# Patient Record
Sex: MALE | Race: BLACK OR AFRICAN AMERICAN | NOT HISPANIC OR LATINO | Employment: UNEMPLOYED | URBAN - METROPOLITAN AREA
[De-identification: names, ages, dates, MRNs, and addresses within clinical notes are randomized per-mention and may not be internally consistent; named-entity substitution may affect disease eponyms.]

---

## 2021-01-04 ENCOUNTER — APPOINTMENT (INPATIENT)
Dept: CT IMAGING | Facility: HOSPITAL | Age: 46
DRG: 720 | End: 2021-01-04
Payer: MEDICAID

## 2021-01-04 ENCOUNTER — APPOINTMENT (EMERGENCY)
Dept: RADIOLOGY | Facility: HOSPITAL | Age: 46
DRG: 720 | End: 2021-01-04
Payer: MEDICAID

## 2021-01-04 ENCOUNTER — HOSPITAL ENCOUNTER (INPATIENT)
Facility: HOSPITAL | Age: 46
LOS: 8 days | Discharge: HOME | DRG: 720 | End: 2021-01-12
Attending: EMERGENCY MEDICINE | Admitting: INTERNAL MEDICINE
Payer: MEDICAID

## 2021-01-04 DIAGNOSIS — R74.01 TRANSAMINITIS: ICD-10-CM

## 2021-01-04 DIAGNOSIS — E87.5 HYPERKALEMIA: ICD-10-CM

## 2021-01-04 DIAGNOSIS — J12.82 PNEUMONIA DUE TO COVID-19 VIRUS: ICD-10-CM

## 2021-01-04 DIAGNOSIS — U07.1 PNEUMONIA DUE TO COVID-19 VIRUS: ICD-10-CM

## 2021-01-04 DIAGNOSIS — R09.02 HYPOXIA: Primary | ICD-10-CM

## 2021-01-04 DIAGNOSIS — R68.89 FLU-LIKE SYMPTOMS: ICD-10-CM

## 2021-01-04 LAB
ALBUMIN SERPL BCP-MCNC: 2.7 G/DL (ref 3.5–5)
ALP SERPL-CCNC: 157 U/L (ref 46–116)
ALT SERPL W P-5'-P-CCNC: 103 U/L (ref 12–78)
ANION GAP SERPL CALCULATED.3IONS-SCNC: 10 MMOL/L (ref 4–13)
APTT PPP: 38 SECONDS (ref 23–37)
AST SERPL W P-5'-P-CCNC: 79 U/L (ref 5–45)
BASOPHILS # BLD MANUAL: 0 THOUSAND/UL (ref 0–0.1)
BASOPHILS NFR MAR MANUAL: 0 % (ref 0–1)
BILIRUB DIRECT SERPL-MCNC: 0.41 MG/DL (ref 0–0.2)
BILIRUB SERPL-MCNC: 0.8 MG/DL (ref 0.2–1)
BUN SERPL-MCNC: 18 MG/DL (ref 5–25)
CALCIUM SERPL-MCNC: 8.7 MG/DL (ref 8.3–10.1)
CHLORIDE SERPL-SCNC: 99 MMOL/L (ref 100–108)
CO2 SERPL-SCNC: 27 MMOL/L (ref 21–32)
CREAT SERPL-MCNC: 1.41 MG/DL (ref 0.6–1.3)
D DIMER PPP FEU-MCNC: 1.44 UG/ML FEU
EOSINOPHIL # BLD MANUAL: 0 THOUSAND/UL (ref 0–0.4)
EOSINOPHIL NFR BLD MANUAL: 0 % (ref 0–6)
ERYTHROCYTE [DISTWIDTH] IN BLOOD BY AUTOMATED COUNT: 12.1 % (ref 11.6–15.1)
GFR SERPL CREATININE-BSD FRML MDRD: 68 ML/MIN/1.73SQ M
GLUCOSE SERPL-MCNC: 170 MG/DL (ref 65–140)
HCT VFR BLD AUTO: 40.9 % (ref 36.5–49.3)
HGB BLD-MCNC: 13.5 G/DL (ref 12–17)
INR PPP: 1.02 (ref 0.84–1.19)
LACTATE SERPL-SCNC: 1.7 MMOL/L (ref 0.5–2)
LYMPHOCYTES # BLD AUTO: 0.59 THOUSAND/UL (ref 0.6–4.47)
LYMPHOCYTES # BLD AUTO: 8 % (ref 14–44)
MAGNESIUM SERPL-MCNC: 2.9 MG/DL (ref 1.6–2.6)
MCH RBC QN AUTO: 30 PG (ref 26.8–34.3)
MCHC RBC AUTO-ENTMCNC: 33 G/DL (ref 31.4–37.4)
MCV RBC AUTO: 91 FL (ref 82–98)
MONOCYTES # BLD AUTO: 0.81 THOUSAND/UL (ref 0–1.22)
MONOCYTES NFR BLD: 11 % (ref 4–12)
NEUTROPHILS # BLD MANUAL: 5.9 THOUSAND/UL (ref 1.85–7.62)
NEUTS BAND NFR BLD MANUAL: 1 % (ref 0–8)
NEUTS SEG NFR BLD AUTO: 79 % (ref 43–75)
NRBC BLD AUTO-RTO: 0 /100 WBCS
NT-PROBNP SERPL-MCNC: 108 PG/ML
PLATELET # BLD AUTO: 425 THOUSANDS/UL (ref 149–390)
PLATELET BLD QL SMEAR: ABNORMAL
PMV BLD AUTO: 9 FL (ref 8.9–12.7)
POTASSIUM SERPL-SCNC: 4.2 MMOL/L (ref 3.5–5.3)
PROT SERPL-MCNC: 8.3 G/DL (ref 6.4–8.2)
PROTHROMBIN TIME: 13.6 SECONDS (ref 11.6–14.5)
RBC # BLD AUTO: 4.5 MILLION/UL (ref 3.88–5.62)
SODIUM SERPL-SCNC: 136 MMOL/L (ref 136–145)
TOTAL CELLS COUNTED SPEC: 100
TROPONIN I SERPL-MCNC: <0.02 NG/ML
VARIANT LYMPHS # BLD AUTO: 1 %
WBC # BLD AUTO: 7.38 THOUSAND/UL (ref 4.31–10.16)

## 2021-01-04 PROCEDURE — 83880 ASSAY OF NATRIURETIC PEPTIDE: CPT | Performed by: EMERGENCY MEDICINE

## 2021-01-04 PROCEDURE — 36415 COLL VENOUS BLD VENIPUNCTURE: CPT | Performed by: EMERGENCY MEDICINE

## 2021-01-04 PROCEDURE — 85610 PROTHROMBIN TIME: CPT | Performed by: EMERGENCY MEDICINE

## 2021-01-04 PROCEDURE — 80076 HEPATIC FUNCTION PANEL: CPT | Performed by: EMERGENCY MEDICINE

## 2021-01-04 PROCEDURE — 84439 ASSAY OF FREE THYROXINE: CPT | Performed by: EMERGENCY MEDICINE

## 2021-01-04 PROCEDURE — 82550 ASSAY OF CK (CPK): CPT | Performed by: PHYSICIAN ASSISTANT

## 2021-01-04 PROCEDURE — 85730 THROMBOPLASTIN TIME PARTIAL: CPT | Performed by: EMERGENCY MEDICINE

## 2021-01-04 PROCEDURE — 99285 EMERGENCY DEPT VISIT HI MDM: CPT | Performed by: EMERGENCY MEDICINE

## 2021-01-04 PROCEDURE — 84443 ASSAY THYROID STIM HORMONE: CPT | Performed by: EMERGENCY MEDICINE

## 2021-01-04 PROCEDURE — 83735 ASSAY OF MAGNESIUM: CPT | Performed by: EMERGENCY MEDICINE

## 2021-01-04 PROCEDURE — 86140 C-REACTIVE PROTEIN: CPT | Performed by: PHYSICIAN ASSISTANT

## 2021-01-04 PROCEDURE — 71275 CT ANGIOGRAPHY CHEST: CPT

## 2021-01-04 PROCEDURE — 80048 BASIC METABOLIC PNL TOTAL CA: CPT | Performed by: EMERGENCY MEDICINE

## 2021-01-04 PROCEDURE — 85379 FIBRIN DEGRADATION QUANT: CPT | Performed by: EMERGENCY MEDICINE

## 2021-01-04 PROCEDURE — 96365 THER/PROPH/DIAG IV INF INIT: CPT

## 2021-01-04 PROCEDURE — 83605 ASSAY OF LACTIC ACID: CPT | Performed by: EMERGENCY MEDICINE

## 2021-01-04 PROCEDURE — 84484 ASSAY OF TROPONIN QUANT: CPT | Performed by: EMERGENCY MEDICINE

## 2021-01-04 PROCEDURE — 82728 ASSAY OF FERRITIN: CPT | Performed by: PHYSICIAN ASSISTANT

## 2021-01-04 PROCEDURE — 85027 COMPLETE CBC AUTOMATED: CPT | Performed by: EMERGENCY MEDICINE

## 2021-01-04 PROCEDURE — 99285 EMERGENCY DEPT VISIT HI MDM: CPT

## 2021-01-04 PROCEDURE — 84145 PROCALCITONIN (PCT): CPT | Performed by: EMERGENCY MEDICINE

## 2021-01-04 PROCEDURE — 71045 X-RAY EXAM CHEST 1 VIEW: CPT

## 2021-01-04 PROCEDURE — 96375 TX/PRO/DX INJ NEW DRUG ADDON: CPT

## 2021-01-04 PROCEDURE — 87040 BLOOD CULTURE FOR BACTERIA: CPT | Performed by: EMERGENCY MEDICINE

## 2021-01-04 PROCEDURE — 85007 BL SMEAR W/DIFF WBC COUNT: CPT | Performed by: EMERGENCY MEDICINE

## 2021-01-04 PROCEDURE — G1004 CDSM NDSC: HCPCS

## 2021-01-04 PROCEDURE — 93005 ELECTROCARDIOGRAM TRACING: CPT

## 2021-01-04 RX ORDER — ALBUTEROL SULFATE 90 UG/1
5 AEROSOL, METERED RESPIRATORY (INHALATION) ONCE
Status: COMPLETED | OUTPATIENT
Start: 2021-01-04 | End: 2021-01-04

## 2021-01-04 RX ORDER — ACETAMINOPHEN 325 MG/1
650 TABLET ORAL ONCE
Status: COMPLETED | OUTPATIENT
Start: 2021-01-04 | End: 2021-01-04

## 2021-01-04 RX ORDER — KETOROLAC TROMETHAMINE 30 MG/ML
15 INJECTION, SOLUTION INTRAMUSCULAR; INTRAVENOUS ONCE
Status: COMPLETED | OUTPATIENT
Start: 2021-01-04 | End: 2021-01-04

## 2021-01-04 RX ORDER — DEXAMETHASONE SODIUM PHOSPHATE 4 MG/ML
6 INJECTION, SOLUTION INTRA-ARTICULAR; INTRALESIONAL; INTRAMUSCULAR; INTRAVENOUS; SOFT TISSUE ONCE
Status: COMPLETED | OUTPATIENT
Start: 2021-01-04 | End: 2021-01-04

## 2021-01-04 RX ORDER — DOXYCYCLINE HYCLATE 100 MG/1
100 CAPSULE ORAL ONCE
Status: COMPLETED | OUTPATIENT
Start: 2021-01-04 | End: 2021-01-04

## 2021-01-04 RX ADMIN — DEXAMETHASONE SODIUM PHOSPHATE 6 MG: 4 INJECTION, SOLUTION INTRAMUSCULAR; INTRAVENOUS at 20:49

## 2021-01-04 RX ADMIN — ACETAMINOPHEN 650 MG: 325 TABLET, FILM COATED ORAL at 21:04

## 2021-01-04 RX ADMIN — IOHEXOL 100 ML: 350 INJECTION, SOLUTION INTRAVENOUS at 22:46

## 2021-01-04 RX ADMIN — ALBUTEROL SULFATE 5 PUFF: 90 AEROSOL, METERED RESPIRATORY (INHALATION) at 20:49

## 2021-01-04 RX ADMIN — SODIUM CHLORIDE 1000 ML: 0.9 INJECTION, SOLUTION INTRAVENOUS at 20:35

## 2021-01-04 RX ADMIN — CEFTRIAXONE SODIUM 1000 MG: 10 INJECTION, POWDER, FOR SOLUTION INTRAVENOUS at 21:05

## 2021-01-04 RX ADMIN — KETOROLAC TROMETHAMINE 15 MG: 30 INJECTION, SOLUTION INTRAMUSCULAR at 22:11

## 2021-01-04 RX ADMIN — DOXYCYCLINE 100 MG: 100 CAPSULE ORAL at 21:04

## 2021-01-04 NOTE — LETTER
To whom it May concern  Patient Hugo Otto,  1975 stated that he has been tested positive for COVID-19 on 2020  He came to our facility on 2021 and was treated for COVID-19 pneumonia, he responded well and he has been discharged on 2021  According to were current recommendations should be in isolation for 20 days after symptom onset, this would be until 2021  After that patient is not deemed transmissible as long as he is asymptomatic as per recommendations  We are discouraging repeat testing unless patient is symptomatic given the fact that test can be positive several weeks after      The information above has been shared as per patient request                       Jose Zapata MD  Internal Medicine

## 2021-01-05 PROBLEM — N17.9 AKI (ACUTE KIDNEY INJURY) (HCC): Status: ACTIVE | Noted: 2021-01-05

## 2021-01-05 PROBLEM — E27.9 ADRENAL NODULE (HCC): Status: ACTIVE | Noted: 2021-01-05

## 2021-01-05 PROBLEM — E04.9 ENLARGED THYROID GLAND: Status: ACTIVE | Noted: 2021-01-05

## 2021-01-05 PROBLEM — E83.41 HYPERMAGNESEMIA: Status: ACTIVE | Noted: 2021-01-05

## 2021-01-05 PROBLEM — J12.82 PNEUMONIA DUE TO COVID-19 VIRUS: Status: ACTIVE | Noted: 2021-01-05

## 2021-01-05 PROBLEM — E27.8 ADRENAL NODULE (HCC): Status: ACTIVE | Noted: 2021-01-05

## 2021-01-05 PROBLEM — U07.1 PNEUMONIA DUE TO COVID-19 VIRUS: Status: ACTIVE | Noted: 2021-01-05

## 2021-01-05 PROBLEM — A41.9 SEPSIS (HCC): Status: ACTIVE | Noted: 2021-01-05

## 2021-01-05 PROBLEM — E10.9 DIABETES MELLITUS TYPE 1 (HCC): Chronic | Status: ACTIVE | Noted: 2021-01-05

## 2021-01-05 PROBLEM — R74.01 TRANSAMINITIS: Status: ACTIVE | Noted: 2021-01-05

## 2021-01-05 LAB
ABO GROUP BLD: NORMAL
ALBUMIN SERPL BCP-MCNC: 2.3 G/DL (ref 3.5–5)
ALP SERPL-CCNC: 140 U/L (ref 46–116)
ALT SERPL W P-5'-P-CCNC: 86 U/L (ref 12–78)
ANION GAP SERPL CALCULATED.3IONS-SCNC: 11 MMOL/L (ref 4–13)
AST SERPL W P-5'-P-CCNC: 50 U/L (ref 5–45)
ATRIAL RATE: 93 BPM
BILIRUB SERPL-MCNC: 0.5 MG/DL (ref 0.2–1)
BUN SERPL-MCNC: 20 MG/DL (ref 5–25)
CALCIUM ALBUM COR SERPL-MCNC: 10 MG/DL (ref 8.3–10.1)
CALCIUM SERPL-MCNC: 8.6 MG/DL (ref 8.3–10.1)
CHLORIDE SERPL-SCNC: 103 MMOL/L (ref 100–108)
CK SERPL-CCNC: 119 U/L (ref 39–308)
CO2 SERPL-SCNC: 25 MMOL/L (ref 21–32)
CREAT SERPL-MCNC: 1.19 MG/DL (ref 0.6–1.3)
CRP SERPL QL: 201 MG/L
D DIMER PPP FEU-MCNC: 2.67 UG/ML FEU
ERYTHROCYTE [DISTWIDTH] IN BLOOD BY AUTOMATED COUNT: 12.1 % (ref 11.6–15.1)
EST. AVERAGE GLUCOSE BLD GHB EST-MCNC: 255 MG/DL
FERRITIN SERPL-MCNC: 1448 NG/ML (ref 8–388)
GFR SERPL CREATININE-BSD FRML MDRD: 84 ML/MIN/1.73SQ M
GLUCOSE SERPL-MCNC: 164 MG/DL (ref 65–140)
GLUCOSE SERPL-MCNC: 180 MG/DL (ref 65–140)
GLUCOSE SERPL-MCNC: 186 MG/DL (ref 65–140)
GLUCOSE SERPL-MCNC: 188 MG/DL (ref 65–140)
GLUCOSE SERPL-MCNC: 207 MG/DL (ref 65–140)
HBA1C MFR BLD: 10.5 %
HCT VFR BLD AUTO: 37.7 % (ref 36.5–49.3)
HGB BLD-MCNC: 12.3 G/DL (ref 12–17)
MAGNESIUM SERPL-MCNC: 3 MG/DL (ref 1.6–2.6)
MCH RBC QN AUTO: 30.1 PG (ref 26.8–34.3)
MCHC RBC AUTO-ENTMCNC: 32.6 G/DL (ref 31.4–37.4)
MCV RBC AUTO: 92 FL (ref 82–98)
NRBC BLD AUTO-RTO: 0 /100 WBCS
P AXIS: 43 DEGREES
PLATELET # BLD AUTO: 405 THOUSANDS/UL (ref 149–390)
PMV BLD AUTO: 8.8 FL (ref 8.9–12.7)
POTASSIUM SERPL-SCNC: 4.8 MMOL/L (ref 3.5–5.3)
PR INTERVAL: 132 MS
PROCALCITONIN SERPL-MCNC: 0.17 NG/ML
PROCALCITONIN SERPL-MCNC: 0.21 NG/ML
PROT SERPL-MCNC: 7.6 G/DL (ref 6.4–8.2)
QRS AXIS: 2 DEGREES
QRSD INTERVAL: 86 MS
QT INTERVAL: 344 MS
QTC INTERVAL: 427 MS
RBC # BLD AUTO: 4.08 MILLION/UL (ref 3.88–5.62)
RH BLD: NEGATIVE
SODIUM SERPL-SCNC: 139 MMOL/L (ref 136–145)
T WAVE AXIS: -25 DEGREES
T4 FREE SERPL-MCNC: 1.19 NG/DL (ref 0.76–1.46)
TSH SERPL DL<=0.05 MIU/L-ACNC: 0.28 UIU/ML (ref 0.36–3.74)
VENTRICULAR RATE: 93 BPM
WBC # BLD AUTO: 6.8 THOUSAND/UL (ref 4.31–10.16)

## 2021-01-05 PROCEDURE — 93010 ELECTROCARDIOGRAM REPORT: CPT | Performed by: INTERNAL MEDICINE

## 2021-01-05 PROCEDURE — 82948 REAGENT STRIP/BLOOD GLUCOSE: CPT

## 2021-01-05 PROCEDURE — 86900 BLOOD TYPING SEROLOGIC ABO: CPT | Performed by: PHYSICIAN ASSISTANT

## 2021-01-05 PROCEDURE — 84145 PROCALCITONIN (PCT): CPT | Performed by: EMERGENCY MEDICINE

## 2021-01-05 PROCEDURE — 83735 ASSAY OF MAGNESIUM: CPT | Performed by: PHYSICIAN ASSISTANT

## 2021-01-05 PROCEDURE — 36415 COLL VENOUS BLD VENIPUNCTURE: CPT | Performed by: PHYSICIAN ASSISTANT

## 2021-01-05 PROCEDURE — 80053 COMPREHEN METABOLIC PANEL: CPT | Performed by: PHYSICIAN ASSISTANT

## 2021-01-05 PROCEDURE — 85379 FIBRIN DEGRADATION QUANT: CPT | Performed by: PHYSICIAN ASSISTANT

## 2021-01-05 PROCEDURE — 86901 BLOOD TYPING SEROLOGIC RH(D): CPT | Performed by: PHYSICIAN ASSISTANT

## 2021-01-05 PROCEDURE — 83036 HEMOGLOBIN GLYCOSYLATED A1C: CPT | Performed by: PHYSICIAN ASSISTANT

## 2021-01-05 PROCEDURE — 85027 COMPLETE CBC AUTOMATED: CPT | Performed by: PHYSICIAN ASSISTANT

## 2021-01-05 PROCEDURE — 99223 1ST HOSP IP/OBS HIGH 75: CPT | Performed by: INTERNAL MEDICINE

## 2021-01-05 RX ORDER — ACETAMINOPHEN 325 MG/1
650 TABLET ORAL EVERY 6 HOURS PRN
Status: COMPLETED | OUTPATIENT
Start: 2021-01-05 | End: 2021-01-06

## 2021-01-05 RX ORDER — ASCORBIC ACID 500 MG
1000 TABLET ORAL EVERY 12 HOURS SCHEDULED
Status: COMPLETED | OUTPATIENT
Start: 2021-01-05 | End: 2021-01-11

## 2021-01-05 RX ORDER — DEXAMETHASONE SODIUM PHOSPHATE 4 MG/ML
6 INJECTION, SOLUTION INTRA-ARTICULAR; INTRALESIONAL; INTRAMUSCULAR; INTRAVENOUS; SOFT TISSUE EVERY 24 HOURS
Status: DISCONTINUED | OUTPATIENT
Start: 2021-01-05 | End: 2021-01-10

## 2021-01-05 RX ORDER — DOXYCYCLINE HYCLATE 100 MG/1
100 CAPSULE ORAL EVERY 12 HOURS
Status: DISCONTINUED | OUTPATIENT
Start: 2021-01-05 | End: 2021-01-12 | Stop reason: HOSPADM

## 2021-01-05 RX ORDER — HEPARIN SODIUM 5000 [USP'U]/ML
5000 INJECTION, SOLUTION INTRAVENOUS; SUBCUTANEOUS EVERY 8 HOURS SCHEDULED
Status: DISCONTINUED | OUTPATIENT
Start: 2021-01-05 | End: 2021-01-07

## 2021-01-05 RX ORDER — SODIUM CHLORIDE 9 MG/ML
50 INJECTION, SOLUTION INTRAVENOUS CONTINUOUS
Status: DISPENSED | OUTPATIENT
Start: 2021-01-05 | End: 2021-01-05

## 2021-01-05 RX ORDER — FAMOTIDINE 20 MG/1
20 TABLET, FILM COATED ORAL 2 TIMES DAILY
Status: DISCONTINUED | OUTPATIENT
Start: 2021-01-05 | End: 2021-01-12 | Stop reason: HOSPADM

## 2021-01-05 RX ORDER — MELATONIN
2000 DAILY
Status: DISCONTINUED | OUTPATIENT
Start: 2021-01-05 | End: 2021-01-12 | Stop reason: HOSPADM

## 2021-01-05 RX ORDER — ZINC SULFATE 50(220)MG
220 CAPSULE ORAL DAILY
Status: COMPLETED | OUTPATIENT
Start: 2021-01-05 | End: 2021-01-11

## 2021-01-05 RX ORDER — KETOROLAC TROMETHAMINE 30 MG/ML
15 INJECTION, SOLUTION INTRAMUSCULAR; INTRAVENOUS ONCE
Status: COMPLETED | OUTPATIENT
Start: 2021-01-05 | End: 2021-01-05

## 2021-01-05 RX ORDER — MULTIVITAMIN/IRON/FOLIC ACID 18MG-0.4MG
1 TABLET ORAL DAILY
Status: DISCONTINUED | OUTPATIENT
Start: 2021-01-12 | End: 2021-01-12 | Stop reason: HOSPADM

## 2021-01-05 RX ORDER — LANOLIN ALCOHOL/MO/W.PET/CERES
3 CREAM (GRAM) TOPICAL
Status: DISCONTINUED | OUTPATIENT
Start: 2021-01-05 | End: 2021-01-12 | Stop reason: HOSPADM

## 2021-01-05 RX ADMIN — HEPARIN SODIUM 5000 UNITS: 5000 INJECTION INTRAVENOUS; SUBCUTANEOUS at 22:02

## 2021-01-05 RX ADMIN — FAMOTIDINE 20 MG: 20 TABLET ORAL at 08:38

## 2021-01-05 RX ADMIN — INSULIN LISPRO 1 UNITS: 100 INJECTION, SOLUTION INTRAVENOUS; SUBCUTANEOUS at 12:30

## 2021-01-05 RX ADMIN — MELATONIN 3 MG: at 22:38

## 2021-01-05 RX ADMIN — OXYCODONE HYDROCHLORIDE AND ACETAMINOPHEN 1000 MG: 500 TABLET ORAL at 08:39

## 2021-01-05 RX ADMIN — Medication 2000 UNITS: at 08:37

## 2021-01-05 RX ADMIN — INSULIN LISPRO 1 UNITS: 100 INJECTION, SOLUTION INTRAVENOUS; SUBCUTANEOUS at 17:45

## 2021-01-05 RX ADMIN — DOXYCYCLINE 100 MG: 100 CAPSULE ORAL at 22:02

## 2021-01-05 RX ADMIN — INSULIN LISPRO 1 UNITS: 100 INJECTION, SOLUTION INTRAVENOUS; SUBCUTANEOUS at 08:37

## 2021-01-05 RX ADMIN — INSULIN LISPRO 1 UNITS: 100 INJECTION, SOLUTION INTRAVENOUS; SUBCUTANEOUS at 22:03

## 2021-01-05 RX ADMIN — SODIUM CHLORIDE 50 ML/HR: 0.9 INJECTION, SOLUTION INTRAVENOUS at 06:16

## 2021-01-05 RX ADMIN — OXYCODONE HYDROCHLORIDE AND ACETAMINOPHEN 1000 MG: 500 TABLET ORAL at 22:02

## 2021-01-05 RX ADMIN — DEXAMETHASONE SODIUM PHOSPHATE 6 MG: 4 INJECTION INTRA-ARTICULAR; INTRALESIONAL; INTRAMUSCULAR; INTRAVENOUS; SOFT TISSUE at 22:03

## 2021-01-05 RX ADMIN — ZINC SULFATE 220 MG (50 MG) CAPSULE 220 MG: CAPSULE at 08:38

## 2021-01-05 RX ADMIN — CEFTRIAXONE SODIUM 1000 MG: 10 INJECTION, POWDER, FOR SOLUTION INTRAVENOUS at 22:03

## 2021-01-05 RX ADMIN — DOXYCYCLINE 100 MG: 100 CAPSULE ORAL at 08:37

## 2021-01-05 RX ADMIN — KETOROLAC TROMETHAMINE 15 MG: 30 INJECTION, SOLUTION INTRAMUSCULAR at 22:35

## 2021-01-05 RX ADMIN — HEPARIN SODIUM 5000 UNITS: 5000 INJECTION INTRAVENOUS; SUBCUTANEOUS at 06:16

## 2021-01-05 RX ADMIN — FAMOTIDINE 20 MG: 20 TABLET ORAL at 17:45

## 2021-01-05 RX ADMIN — HEPARIN SODIUM 5000 UNITS: 5000 INJECTION INTRAVENOUS; SUBCUTANEOUS at 14:53

## 2021-01-05 NOTE — ASSESSMENT & PLAN NOTE
· Most likely in setting COVID-19 pneumonia  · Monitor CMP  · Attempt to avoid hepatotoxic agents, though patient has been febrile and with JUANJ OSE will provide with p r n  1 dose Tylenol

## 2021-01-05 NOTE — ASSESSMENT & PLAN NOTE
· Reports being diagnosed on 12/23 with COVID-19  · Since this past Sunday has reported feeling increased generalized weakness, fatigue, shortness of breath with productive cough  · Will follow mild COVID protocol as patient requiring 2 L nasal cannula which will continue  · Due to being diagnosed 2 weeks ago with COVID-19, will hold off on IV remdesivir  · Vitamin-C, DE, zinc  · Continue IV Decadron 6 mg q d  Started in ED  · Continue IV ceftriaxone 1 g q d   And doxycycline p o  100 mg q 12 hours started in ED  · Chest x-ray revealing COVID pneumonia  · CT A negative for PE, revealing ground-glass COVID pneumonia appearance  · D-dimer elevated at 1 44, negative for PE, continue to trend

## 2021-01-05 NOTE — INCIDENTAL FINDINGS
The following findings require follow up:  Radiographic finding   Finding: "3 5 x 2 8 cm nodule in the right adrenal gland, nonspecific but one-year follow-up recommended to exclude interval change/growth     Diffuse enlargement of the thyroid gland, nonemergent correlation with the patient's thyroid function tests recommended "       Follow up required: with primary care physician   Follow up should be done within 1-2 week(s)    Please notify the following clinician to assist with the follow up:   Primary care physician

## 2021-01-05 NOTE — ED PROVIDER NOTES
History  Chief Complaint   Patient presents with    Shortness of Breath     pt reports SOB, weakness, and "colorblindness" reports positive covid dx on 23rd     Patient is a 47-year-old male with a history of type 2 diabetes, hyperlipidemia, asthma, presents to the emergency department complaining of dyspnea and generalized weakness over the past 2-3 weeks  Patient reports that he started feeling unwell on 12/23 and went to an urgent care center in [facility] and tested positive for COVID-19  Since then he has had cough, progressive generalized body aches and weakness, headaches, lack of energy and lack of appetite, loss of sensation of taste and smell and progressive dyspnea  He reports that today his breathing significantly worsened which prompted him to come to the ER  On arrival to the ER, patient's initial O2 sat during triage was 90% on room air  When taken back to room, his repeat O2 sat at rest was 84% on room air  He was placed on 2 L nasal cannula and immediately went up into the low-mid 90s  Patient does report intermittent fleeting chest pains throughout the past couple of weeks but denies any lasting chest pressure heaviness  He has had subjective fever and chills  Patient also reports that he has had increased thirst and urination and feels very dehydrated  He denies any dizziness or near syncope, sore throat, runny nose or congestion, neck pain or stiffness, palpitations, abdominal pain, nausea, vomiting, diarrhea, constipation, dysuria, hematuria, skin rash or color change, extremity swelling or pain, lateralizing extremity weakness or paresthesia other focal neurologic deficits  Patient denies smoking        History provided by:  Patient   used: No    Shortness of Breath  Associated symptoms: chest pain, cough, headaches and wheezing    Associated symptoms: no abdominal pain, no ear pain, no neck pain, no rash, no sore throat and no vomiting        None History reviewed  No pertinent past medical history  History reviewed  No pertinent surgical history  History reviewed  No pertinent family history  I have reviewed and agree with the history as documented  E-Cigarette/Vaping     E-Cigarette/Vaping Substances     Social History     Tobacco Use    Smoking status: Never Smoker    Smokeless tobacco: Never Used   Substance Use Topics    Alcohol use: Not Currently    Drug use: Not Currently       Review of Systems   Constitutional: Positive for appetite change, chills and fatigue  HENT: Negative for congestion, ear pain, rhinorrhea and sore throat  Respiratory: Positive for cough, chest tightness, shortness of breath and wheezing  Cardiovascular: Positive for chest pain  Negative for palpitations and leg swelling  Gastrointestinal: Negative for abdominal pain, constipation, diarrhea, nausea and vomiting  Endocrine: Positive for polydipsia and polyuria  Genitourinary: Positive for frequency  Negative for dysuria, flank pain and hematuria  Musculoskeletal: Positive for myalgias  Negative for back pain, neck pain and neck stiffness  Skin: Negative for color change, pallor, rash and wound  Allergic/Immunologic: Negative for immunocompromised state  Neurological: Positive for weakness and headaches  Negative for dizziness, syncope, light-headedness and numbness  Hematological: Negative for adenopathy  Psychiatric/Behavioral: Negative for confusion and decreased concentration  All other systems reviewed and are negative  Physical Exam  Physical Exam  Vitals signs and nursing note reviewed  Constitutional:       General: He is not in acute distress  Appearance: Normal appearance  He is well-developed  He is not ill-appearing, toxic-appearing or diaphoretic  HENT:      Head: Normocephalic and atraumatic        Right Ear: External ear normal       Left Ear: External ear normal       Mouth/Throat:      Comments: Orpharyngeal exam deferred at this time due to risk of exposure to COVID-19 during current pandemic  Patient has no oropharyngeal complaints  Eyes:      Extraocular Movements: Extraocular movements intact  Conjunctiva/sclera: Conjunctivae normal    Neck:      Musculoskeletal: Normal range of motion and neck supple  No neck rigidity  Vascular: No JVD  Cardiovascular:      Rate and Rhythm: Normal rate and regular rhythm  Pulses: Normal pulses  Heart sounds: Normal heart sounds  No murmur  No friction rub  No gallop  Pulmonary:      Effort: Pulmonary effort is normal  No respiratory distress  Breath sounds: Rales present  No wheezing  Comments: Bibasilar rales noted  Chest:      Chest wall: No tenderness  Abdominal:      General: Bowel sounds are normal  There is no distension  Palpations: Abdomen is soft  Tenderness: There is no abdominal tenderness  There is no guarding or rebound  Musculoskeletal: Normal range of motion  General: No swelling or tenderness  Skin:     General: Skin is warm and dry  Coloration: Skin is not pale  Findings: No erythema or rash  Neurological:      General: No focal deficit present  Mental Status: He is alert and oriented to person, place, and time  Sensory: No sensory deficit  Motor: No weakness     Psychiatric:         Mood and Affect: Mood normal          Behavior: Behavior normal          Vital Signs  ED Triage Vitals   Temperature Pulse Respirations Blood Pressure SpO2   01/04/21 1802 01/04/21 1802 01/04/21 1802 01/04/21 1802 01/04/21 1802   100 5 °F (38 1 °C) 104 22 111/67 90 %      Temp Source Heart Rate Source Patient Position - Orthostatic VS BP Location FiO2 (%)   01/04/21 1802 01/04/21 2003 01/04/21 1802 01/04/21 1802 --   Oral Monitor Sitting Left arm       Pain Score       01/04/21 1802       No Pain         Vitals:    01/04/21 2003 01/04/21 2100 01/04/21 2130 01/04/21 2200   BP:  139/95 135/74 134/64   BP Location:  Right arm Right arm Right arm   Pulse: 86 87 94 (!) 106   Resp: (!) 26 (!) 27 (!) 29 (!) 33   Temp:  (!) 102 6 °F (39 2 °C)     TempSrc:  Oral     SpO2: (!) 83% 96% 100% 95%   Weight:           Visual Acuity      ED Medications  Medications   sodium chloride 0 9 % bolus 1,000 mL (0 mL Intravenous Stopped 1/4/21 2211)   dexamethasone (DECADRON) injection 6 mg (6 mg Intravenous Given 1/4/21 2049)   albuterol (PROVENTIL HFA,VENTOLIN HFA) inhaler 5 puff (5 puffs Inhalation Given 1/4/21 2049)   ceftriaxone (ROCEPHIN) 1 g/50 mL in dextrose IVPB (0 mg Intravenous Stopped 1/4/21 2211)   doxycycline hyclate (VIBRAMYCIN) capsule 100 mg (100 mg Oral Given 1/4/21 2104)   acetaminophen (TYLENOL) tablet 650 mg (650 mg Oral Given 1/4/21 2104)   ketorolac (TORADOL) injection 15 mg (15 mg Intravenous Given 1/4/21 2211)   iohexol (OMNIPAQUE) 350 MG/ML injection (MULTI-DOSE) 100 mL (100 mL Intravenous Given 1/4/21 2246)       Diagnostic Studies  Results Reviewed     Procedure Component Value Units Date/Time    TSH, 3rd generation with Free T4 reflex [[id number]]     Lab Status: No result Specimen: Blood     Manual Differential(PHLEBS Do Not Order) [[id number]]  (Abnormal) Collected: 01/04/21 2052    Lab Status: Final result Specimen: Blood from Arm, Right Updated: 01/04/21 2136     Segmented % 79 %      Bands % 1 %      Lymphocytes % 8 %      Monocytes % 11 %      Eosinophils, % 0 %      Basophils % 0 %      Atypical Lymphocytes % 1 %      Absolute Neutrophils 5 90 Thousand/uL      Lymphocytes Absolute 0 59 Thousand/uL      Monocytes Absolute 0 81 Thousand/uL      Eosinophils Absolute 0 00 Thousand/uL      Basophils Absolute 0 00 Thousand/uL      Total Counted 100     Platelet Estimate Increased    CBC and differential [[id number]]  (Abnormal) Collected: 01/04/21 2052    Lab Status: Final result Specimen: Blood from Arm, Right Updated: 01/04/21 2136     WBC 7 38 Thousand/uL      RBC 4 50 Million/uL Hemoglobin 13 5 g/dL      Hematocrit 40 9 %      MCV 91 fL      MCH 30 0 pg      MCHC 33 0 g/dL      RDW 12 1 %      MPV 9 0 fL      Platelets 436 Thousands/uL      nRBC 0 /100 WBCs     Hepatic function panel [[id number]]  (Abnormal) Collected: 01/04/21 2052    Lab Status: Final result Specimen: Blood from Arm, Right Updated: 01/04/21 2126     Total Bilirubin 0 80 mg/dL      Bilirubin, Direct 0 41 mg/dL      Alkaline Phosphatase 157 U/L      AST 79 U/L       U/L      Total Protein 8 3 g/dL      Albumin 2 7 g/dL     Magnesium [[id number]]  (Abnormal) Collected: 01/04/21 2052    Lab Status: Final result Specimen: Blood from Arm, Right Updated: 01/04/21 2126     Magnesium 2 9 mg/dL     NT-BNP PRO [[id number]]  (Normal) Collected: 01/04/21 2052    Lab Status: Final result Specimen: Blood from Arm, Right Updated: 01/04/21 2126     NT-proBNP 108 pg/mL     Lactic acid [[id number]]  (Normal) Collected: 01/04/21 2052    Lab Status: Final result Specimen: Blood from Arm, Right Updated: 01/04/21 2121     LACTIC ACID 1 7 mmol/L     Narrative:      Result may be elevated if tourniquet was used during collection      Troponin I [[id number]]  (Normal) Collected: 01/04/21 2052    Lab Status: Final result Specimen: Blood from Arm, Right Updated: 01/04/21 2120     Troponin I <0 02 ng/mL     Basic metabolic panel [[id number]]  (Abnormal) Collected: 01/04/21 2052    Lab Status: Final result Specimen: Blood from Arm, Right Updated: 01/04/21 2119     Sodium 136 mmol/L      Potassium 4 2 mmol/L      Chloride 99 mmol/L      CO2 27 mmol/L      ANION GAP 10 mmol/L      BUN 18 mg/dL      Creatinine 1 41 mg/dL      Glucose 170 mg/dL      Calcium 8 7 mg/dL      eGFR 68 ml/min/1 73sq m     Narrative:      Lazarus guidelines for Chronic Kidney Disease (CKD):     Stage 1 with normal or high GFR (GFR > 90 mL/min/1 73 square meters)    Stage 2 Mild CKD (GFR = 60-89 mL/min/1 73 square meters)    Stage 3A Moderate CKD (GFR = 45-59 mL/min/1 73 square meters)    Stage 3B Moderate CKD (GFR = 30-44 mL/min/1 73 square meters)    Stage 4 Severe CKD (GFR = 15-29 mL/min/1 73 square meters)    Stage 5 End Stage CKD (GFR <15 mL/min/1 73 square meters)  Note: GFR calculation is accurate only with a steady state creatinine    D-Dimer [[id number]]  (Abnormal) Collected: 01/04/21 2052    Lab Status: Final result Specimen: Blood from Arm, Right Updated: 01/04/21 2117     D-Dimer, Quant 1 44 ug/ml FEU     Protime-INR [[id number]]  (Normal) Collected: 01/04/21 2052    Lab Status: Final result Specimen: Blood from Arm, Right Updated: 01/04/21 2115     Protime 13 6 seconds      INR 1 02    APTT [[id number]]  (Abnormal) Collected: 01/04/21 2052    Lab Status: Final result Specimen: Blood from Arm, Right Updated: 01/04/21 2115     PTT 38 seconds     Procalcitonin with AM Reflex [[id number]] Collected: 01/04/21 2052    Lab Status: In process Specimen: Blood from Arm, Right Updated: 01/04/21 2059    Blood culture #2 [[id number]] Collected: 01/04/21 2052    Lab Status: In process Specimen: Blood from Arm, Right Updated: 01/04/21 2059    Blood culture #1 [[id number]] Collected: 01/04/21 2052    Lab Status: In process Specimen: Blood from Arm, Right Updated: 01/04/21 2059                 CTA ED chest PE study   Final Result by Manjit Funez DO (01/04 2826)      Some images are suboptimal secondary to respiratory motion which decreases sensitivity for evaluation of peripheral pulmonary emboli, subject to this, no pulmonary embolism is seen  Multiple focal and confluent areas of groundglass and alveolar opacity, more prominent in the periphery and in the bilateral lower lobes which correlates with the patient's history of multifocal infection and COVID-19  3 5 x 2 8 cm nodule in the right adrenal gland, nonspecific but one-year follow-up recommended to exclude interval change/growth        Diffuse enlargement of the thyroid gland, nonemergent correlation with the patient's thyroid function tests recommended  Other findings as above  Workstation performed: [id number]         XR chest 1 view portable   ED Interpretation by Sergei Chan DO (01/04 2058)   Bilateral ground-glass opacities consistent with bilateral pneumonia  Procedures  ECG 12 Lead Documentation Only    Date/Time: 1/4/2021 8:51 PM  Performed by: Sergei Chan DO  Authorized by: Sergei Chan DO     ECG reviewed by me, the ED Provider: yes    Patient location:  ED  Previous ECG:     Previous ECG:  Unavailable  Rate:     ECG rate:  93    ECG rate assessment: normal    Rhythm:     Rhythm: sinus rhythm    Ectopy:     Ectopy: none    QRS:     QRS axis:  Normal    QRS intervals:  Normal  Conduction:     Conduction: normal    ST segments:     ST segments:  Normal  T waves:     T waves: normal    Q waves:     Q waves:  III and aVF  Other findings:     Other findings: LVH               ED Course  ED Course as of Jan 05 0019   Tue Jan 05, 2021   0018 Updated patient about workup thus far including incidental CT findings of adrenal nodule for which 1 year follow-up is recommended  I also updated him about the thyroid gland enlargement which also needs to be worked up  Patient overall appears well and not in any acute respiratory distress and satting mid-90's on 2L NC  Patient admitted to Medicine                          PERC Rule for PE      Most Recent Value   PERC Rule for PE   Age >=50  0 Filed at: 01/04/2021 2135   HR >=100  0 Filed at: 01/04/2021 2135   O2 Sat on room air < 95%  1 Filed at: 01/04/2021 2135   History of PE or DVT  0 Filed at: 01/04/2021 2135   Recent trauma or surgery  0 Filed at: 01/04/2021 2135   Hemoptysis  0 Filed at: 01/04/2021 2135   Exogenous estrogen  0 Filed at: 01/04/2021 2135   Unilateral leg swelling  0 Filed at: 01/04/2021 2135   PERC Rule for PE Results  1 Filed at: 01/04/2021 2135                  Wells' Criteria for PE Most Recent Value   Wells' Criteria for PE   Clinical signs and symptoms of DVT  0 Filed at: 01/04/2021 2133   PE is primary diagnosis or equally likely  3 Filed at: 01/04/2021 2133   HR >100  0 Filed at: 01/04/2021 2133   Immobilization at least 3 days or Surgery in the previous 4 weeks  0 Filed at: 01/04/2021 2133   Previous, objectively diagnosed PE or DVT  0 Filed at: 01/04/2021 2133   Hemoptysis  0 Filed at: 01/04/2021 2133   Malignancy with treatment within 6 months or palliative  0 Filed at: 01/04/2021 2133   Clydia Guffey' Criteria Total  3 Filed at: 01/04/2021 2133                MDM  Number of Diagnoses or Management Options  Diagnosis management comments: 40-year-old male with history of asthma, hyperlipidemia and diabetes, presents for progressively worsening flu-like symptoms and dyspnea in the setting of positive COVID test on 12/23  Patient is hypoxic with resting O2 sat 84% on room air  He responded well to 2 L nasal cannula oxygen  Will do full cardiac and septic workup, chest x-ray, D-dimer  Will give Decadron and albuterol inhaler as well as IV fluids  Patient to be admitted due to a new oxygen requirements  If chest x-ray shows pneumonia, will cover with Rocephin and doxycycline per guidelines despite pneumonia most likely being secondary to COVID-19  Patient agreeable with plan         Amount and/or Complexity of Data Reviewed  Clinical lab tests: ordered and reviewed  Tests in the radiology section of CPT®: ordered and reviewed  Tests in the medicine section of CPT®: ordered and reviewed  Independent visualization of images, tracings, or specimens: yes        Disposition  Final diagnoses:   Hypoxia   Pneumonia due to COVID-19 virus   Transaminitis   Flu-like symptoms     Time reflects when diagnosis was documented in both MDM as applicable and the Disposition within this note     Time User Action Codes Description Comment    1/4/2021  9:44 PM Cassidy CHERY Add [R09 02] Hypoxia     1/4/2021 9:44 PM Baron Stew Flaherty [U07 1,  J12 82] Pneumonia due to COVID-19 virus     1/4/2021  9:44 PM Baron Stew Flaherty [R74 01] Transaminitis     1/4/2021  9:44 PM Baron Stew Flaherty [R68 89] Flu-like symptoms       ED Disposition     ED Disposition Condition Date/Time Comment    Admit Stable Mon Jan 4, 2021  9:44 PM Case was discussed with ALYSHA and the patient's admission status was agreed to be Admission Status: inpatient status to the service of Dr Bailey Gomez   Follow-up Information    None         Patient's Medications    No medications on file     No discharge procedures on file      PDMP Review     None          ED Provider  Electronically Signed by           Thania Zarate DO  01/05/21 [id number]

## 2021-01-05 NOTE — ASSESSMENT & PLAN NOTE
· Incidental right adrenal nodule noted on CT a  · Per radiology follow-up in 1 year, please let patient know on discharge

## 2021-01-05 NOTE — H&P
H&P- Veronica Reach 1975, 50 y o  male MRN: [id number]    Unit/Bed#: ED 10 Encounter: [id number]    Primary Care Provider: No primary care provider on file  Date and time admitted to hospital: 1/4/2021  8:02 PM        * Pneumonia due to COVID-19 virus  Assessment & Plan  · Reports being diagnosed on 12/23 with COVID-19  · Since this past Sunday has reported feeling increased generalized weakness, fatigue, shortness of breath with productive cough  · Will follow mild COVID protocol as patient requiring 2 L nasal cannula which will continue  · Due to being diagnosed 2 weeks ago with COVID-19, will hold off on IV remdesivir  · Vitamin-C, DE, zinc  · Continue IV Decadron 6 mg q d  Started in ED  · Continue IV ceftriaxone 1 g q d  And doxycycline p o  100 mg q 12 hours started in ED  · Chest x-ray revealing COVID pneumonia  · CT A negative for PE, revealing ground-glass COVID pneumonia appearance  · D-dimer elevated at 1 44, negative for PE, continue to trend    Enlarged thyroid gland  Assessment & Plan  · Incidental on CT a  · Check TSH    Adrenal nodule ([facility] [address] )  Assessment & Plan  · Incidental right adrenal nodule noted on CT a  · Per radiology follow-up in 1 year, please let patient know on discharge    Hypermagnesemia  Assessment & Plan  · Slightly elevated at 2 9  · Avoid magnesium supplements  · Provided with NSS IV fluid hydration  · Recheck magnesium in a m    · If no change consider adding dose of Lasix    Sepsis ([facility] [address] )  Assessment & Plan  · Currently meeting criteria due to tachycardia, tachypnea, febrile in setting of COVID-19 pneumonia  · Lactic acid WNL  · Blood culture x2 pending with procalcitonin  · Continue antibiotics  · Continue IV fluid hydration    Transaminitis  Assessment & Plan  · Most likely in setting COVID-19 pneumonia  · Monitor CMP  · Attempt to avoid hepatotoxic agents, though patient has been febrile and with JUAN JOSE will provide with p r n  1 dose Tylenol    Diabetes mellitus type 1 [facility])  Assessment & Plan  No results found for: HGBA1C    No results for input(s): POCGLU in the last 72 hours  Blood Sugar Average: Last 72 hrs:  ·  Check A1c  · Blood glucose checks q i d , hypoglycemia protocol  · Will hold home metformin  · Sliding scale insulin      JUAN JOSE (acute kidney injury) ([facility] [address] )  Assessment & Plan  · Creatinine currently 1 41  · Denies history of CKD, no baseline per review of chart  · Will provide with gentle IV fluid hydration at 50 mL per for the next 5 hours  · Trend CMP        VTE Prophylaxis: Heparin  / sequential compression device   Code Status:  Level 1, full code  POLST: POLST form is not discussed and not completed at this time  Anticipated Length of Stay:  Patient will be admitted on an Inpatient basis with an anticipated length of stay of  greater than 2 midnights  Justification for Hospital Stay:  See above    Total Time for Visit, including Counseling / Coordination of Care: 1 hour  Greater than 50% of this total time spent on direct patient counseling and coordination of care  Chief Complaint:     Chief Complaint   Patient presents with    Shortness of Breath     pt reports SOB, weakness, and "colorblindness" reports positive covid dx on 23rd       History of Present Illness:    Renee Vanessa is a 50 y o  male with PMH not limited to diabetes mellitus type 1 who presents with gradual worsening of shortness of breath with productive cough, generalized weakness and fatigue x2 days  He reports he was diagnosed 12/23 with COVID-19  Reports since then he has felt gradually worse especially over the last 2 days  Denies chest pain  Admits appetite has been okay  Reports his main complaint is that shortness of breath and weakness       Review of Systems:    Review of Systems   Constitutional: Positive for activity change, fatigue and fever  Negative for appetite change  Respiratory: Positive for cough and shortness of breath      Cardiovascular: Negative for chest pain, palpitations and leg swelling  Gastrointestinal: Negative for abdominal pain and diarrhea  Neurological: Positive for weakness  Negative for dizziness and headaches  Past Medical and Surgical History:     History reviewed  No pertinent past medical history  History reviewed  No pertinent surgical history  Meds/Allergies:    Prior to Admission medications    Medication Sig Start Date End Date Taking? Authorizing Provider   metFORMIN (GLUCOPHAGE) 500 mg tablet Take 500 mg by mouth 2 (two) times a day with meals   Yes Historical Provider, MD     I have reviewed home medications with patient personally  Allergies: No Known Allergies    Social History:     Marital Status: Single   OPatient Pre-hospital Level of Mobility:  Independent  Patient Pre-hospital Diet Restrictions:  Diabetic  Substance Use History:   Social History     Substance and Sexual Activity   Alcohol Use Not Currently     Social History     Tobacco Use   Smoking Status Never Smoker   Smokeless Tobacco Never Used     Social History     Substance and Sexual Activity   Drug Use Not Currently       Family History:    History reviewed  No pertinent family history  Physical Exam:     Vitals:   Blood Pressure: 134/64 (01/04/21 2200)  Pulse: (!) 106 (01/04/21 2200)  Temperature: (!) 102 6 °F (39 2 °C) (01/04/21 2100)  Temp Source: Oral (01/04/21 2100)  Respirations: (!) 33 (01/04/21 2200)  Weight - Scale: 78 9 kg (174 lb) (01/04/21 1802)  SpO2: 95 % (01/04/21 2200)    Physical Exam  Vitals signs and nursing note reviewed  Constitutional:       General: He is not in acute distress  Appearance: Normal appearance  HENT:      Head: Normocephalic  Cardiovascular:      Rate and Rhythm: Normal rate and regular rhythm  Pulses: Normal pulses  Heart sounds: Normal heart sounds  Pulmonary:      Effort: Pulmonary effort is normal  No respiratory distress  Breath sounds: Normal breath sounds  No stridor   No wheezing or rales    Abdominal:      General: Abdomen is flat  Bowel sounds are normal  There is no distension  Palpations: Abdomen is soft  Tenderness: There is no abdominal tenderness  There is no guarding  Musculoskeletal:         General: No tenderness  Right lower leg: No edema  Left lower leg: No edema  Skin:     General: Skin is warm and dry  Findings: No erythema  Neurological:      General: No focal deficit present  Mental Status: He is alert and oriented to person, place, and time  Mental status is at baseline  Psychiatric:         Mood and Affect: Mood normal          Behavior: Behavior normal          Thought Content: Thought content normal          Judgment: Judgment normal              Additional Data:     Lab Results: I have personally reviewed pertinent reports  Results from last 7 days   Lab Units 01/04/21 2052   WBC Thousand/uL 7 38   HEMOGLOBIN g/dL 13 5   HEMATOCRIT % 40 9   PLATELETS Thousands/uL 425*   BANDS PCT % 1   LYMPHO PCT % 8*   MONO PCT % 11   EOS PCT % 0     Results from last 7 days   Lab Units 01/04/21 2052   SODIUM mmol/L 136   POTASSIUM mmol/L 4 2   CHLORIDE mmol/L 99*   CO2 mmol/L 27   BUN mg/dL 18   CREATININE mg/dL 1 41*   ANION GAP mmol/L 10   CALCIUM mg/dL 8 7   ALBUMIN g/dL 2 7*   TOTAL BILIRUBIN mg/dL 0 80   ALK PHOS U/L 157*   ALT U/L 103*   AST U/L 79*   GLUCOSE RANDOM mg/dL 170*     Results from last 7 days   Lab Units 01/04/21 2052   INR  1 02             Results from last 7 days   Lab Units 01/04/21 2052   LACTIC ACID mmol/L 1 7       Imaging: I have personally reviewed pertinent reports  and I have personally reviewed pertinent films in PACS    CTA ED chest PE study   Final Result by Kena Silva DO (01/04 2908)      Some images are suboptimal secondary to respiratory motion which decreases sensitivity for evaluation of peripheral pulmonary emboli, subject to this, no pulmonary embolism is seen        Multiple focal and confluent areas of groundglass and alveolar opacity, more prominent in the periphery and in the bilateral lower lobes which correlates with the patient's history of multifocal infection and COVID-19  3 5 x 2 8 cm nodule in the right adrenal gland, nonspecific but one-year follow-up recommended to exclude interval change/growth  Diffuse enlargement of the thyroid gland, nonemergent correlation with the patient's thyroid function tests recommended  Other findings as above  Workstation performed: [id number]         XR chest 1 view portable   ED Interpretation by Geovanna Bello DO (01/04 2058)   Bilateral ground-glass opacities consistent with bilateral pneumonia  Allscripts / Epic Records Reviewed: Yes     ** Please Note: This note has been constructed using a voice recognition system   **

## 2021-01-05 NOTE — ASSESSMENT & PLAN NOTE
· Creatinine currently 1 41  · Denies history of CKD, no baseline per review of chart  · Will provide with gentle IV fluid hydration at 50 mL per for the next 5 hours  · Trend CMP

## 2021-01-05 NOTE — ASSESSMENT & PLAN NOTE
No results found for: HGBA1C    No results for input(s): POCGLU in the last 72 hours      Blood Sugar Average: Last 72 hrs:  ·  Check A1c  · Blood glucose checks q i d , hypoglycemia protocol  · Will hold home metformin  · Sliding scale insulin

## 2021-01-05 NOTE — UTILIZATION REVIEW
Initial Clinical Review    Admission: Date/Time/Statement:   Admission Orders (From admission, onward)     Ordered        01/04/21 2144  Inpatient Admission  Once                   Orders Placed This Encounter   Procedures    Inpatient Admission     Standing Status:   Standing     Number of Occurrences:   1     Order Specific Question:   Admitting Physician     Answer:   Rylie Fam [[id number]]     Order Specific Question:   Level of Care     Answer:   Med Surg [[id number]]     Order Specific Question:   Estimated length of stay     Answer:   More than 2 Midnights     Order Specific Question:   Certification     Answer:   I certify that inpatient services are medically necessary for this patient for a duration of greater than two midnights  See H&P and MD Progress Notes for additional information about the patient's course of treatment  ED Arrival Information     Expected Arrival Acuity Means of Arrival Escorted By Service Admission Type    - 1/4/2021 17:55 Emergent Walk-In Self General Medicine Emergency    Arrival Complaint    Shortness Of Breath,Covid +        Chief Complaint   Patient presents with    Shortness of Breath     pt reports SOB, weakness, and "colorblindness" reports positive covid dx on 23rd     Assessment/Plan: 49 yo diabetic m to ED from home admitted as inpatient due to sepsis from [facility] 19 pneumonia and JUAN JOSE w/hypermagnesemia  Presented with gradual worsening sob with yellow productive cough, and 2 days of generalized weakness and fatigue  Dx COVID positive on 12/23  The worse sx are sob and weakness  Exam unremarkable  PE study & CXR showed groundglass opacities  Requiring 2 liters o2, starting IV steroids, IVF, COVID vitamins, IV and PO antbx, no remdesivir due to 2 week duration  Trending inflammatory markers  D dimer elevated  Serial labs  1/5:  Fever up to 102 6 last night  Was on 2 liters this am, able to wean to RA   sat varies from 92 to 96%   Continue IV steroids, IV and PO antbx, COVID vitamins     ED Triage Vitals   Temperature Pulse Respirations Blood Pressure SpO2   01/04/21 1802 01/04/21 1802 01/04/21 1802 01/04/21 1802 01/04/21 1802   100 5 °F (38 1 °C) 104 22 111/67 90 %      Temp Source Heart Rate Source Patient Position - Orthostatic VS BP Location FiO2 (%)   01/04/21 1802 01/04/21 2003 01/04/21 1802 01/04/21 1802 --   Oral Monitor Sitting Left arm       Pain Score       01/04/21 1802       No Pain          Wt Readings from Last 1 Encounters:   01/04/21 78 9 kg (174 lb)     Additional Vital Signs:   Date/Time  Temp  Pulse Resp  BP  MAP (mmHg)  SpO2   Nasal Cannula O2 Flow Rate (L/min)  O2 Device  Patient Position - Orthostatic VS   01/05/21 1500  97 9 °F (36 6 °C)  62 18  149/77    96 %     None (Room air)  Lying   01/05/21 1115    63 16      92 %     None (Room air)     01/05/21 1045    67 18      95 %     None (Room air)     01/05/21 1000    84 20  125/84  100  94 %          01/05/21 0845    54Abnormal  18      97 %          01/05/21 0830  97 7 °F (36 5 °C)                   01/05/21 0800    66 20  159/70  101  95 %   2 L/min  Nasal cannula     01/04/21 2200    106Abnormal  33Abnormal   134/64  88  95 %     None (Room air)  Sitting   01/04/21 2130    94 29Abnormal   135/74  99  100 %   2 L/min  Nasal cannula  Lying   01/04/21 2100  102 6 °F (39 2 °C)Abnormal   87 27Abnormal   139/95  111  96 %   2 L/min  Nasal cannula  Lying   01/04/21 2003    86 26Abnormal       83 %Abnormal      None (Room air)         Pertinent Labs/Diagnostic Test Results:        CTA ED chest PE study   Final Result by Shana Bach DO (01/04 3304)       Some images are suboptimal secondary to respiratory motion which decreases sensitivity for evaluation of peripheral pulmonary emboli, subject to this, no pulmonary embolism is seen        Multiple focal and confluent areas of groundglass and alveolar opacity, more prominent in the periphery and in the bilateral lower lobes which correlates with the patient's history of multifocal infection and COVID-19        3 5 x 2 8 cm nodule in the right adrenal gland, nonspecific but one-year follow-up recommended to exclude interval change/growth        Diffuse enlargement of the thyroid gland, nonemergent correlation with the patient's thyroid function tests recommended  XR chest 1 view portable   ED Interpretation by Flor Ramirez DO (01/04 2058)   Bilateral ground-glass opacities consistent with bilateral pneumonia       1/4 EKG nsr, LVH, age undetermined inferior infarct    Results from last 7 days   Lab Units 01/05/21 0617 01/04/21 2052   WBC Thousand/uL 6 80 7 38   HEMOGLOBIN g/dL 12 3 13 5   HEMATOCRIT % 37 7 40 9   PLATELETS Thousands/uL 405* 425*   BANDS PCT %  --  1         Results from last 7 days   Lab Units 01/05/21 0617 01/04/21 2052   SODIUM mmol/L 139 136   POTASSIUM mmol/L 4 8 4 2   CHLORIDE mmol/L 103 99*   CO2 mmol/L 25 27   ANION GAP mmol/L 11 10   BUN mg/dL 20 18   CREATININE mg/dL 1 19 1 41*   EGFR ml/min/1 73sq m 84 68   CALCIUM mg/dL 8 6 8 7   MAGNESIUM mg/dL 3 0* 2 9*     Results from last 7 days   Lab Units 01/05/21 0617 01/04/21 2052   AST U/L 50* 79*   ALT U/L 86* 103*   ALK PHOS U/L 140* 157*   TOTAL PROTEIN g/dL 7 6 8 3*   ALBUMIN g/dL 2 3* 2 7*   TOTAL BILIRUBIN mg/dL 0 50 0 80   BILIRUBIN DIRECT mg/dL  --  0 41*     Results from last 7 days   Lab Units 01/05/21  1226 01/05/21  0829   POC GLUCOSE mg/dl 207* 188*     Results from last 7 days   Lab Units 01/05/21 0617 01/04/21 2052   GLUCOSE RANDOM mg/dL 186* 170*         Results from last 7 days   Lab Units 01/05/21 0617   HEMOGLOBIN A1C % 10 5*   EAG mg/dl 255     Results from last 7 days   Lab Units 01/04/21 2052   CK TOTAL U/L 119     Results from last 7 days   Lab Units 01/04/21 2052   TROPONIN I ng/mL <0 02     Results from last 7 days   Lab Units 01/05/21 0617 01/04/21 2052   D-DIMER QUANTITATIVE ug/ml FEU 2 67* 1 44*     Results from last 7 days   Lab Units 01/04/21 2052   PROTIME seconds 13 6   INR  1 02   PTT seconds 38*     Results from last 7 days   Lab Units 01/04/21 2052   TSH 3RD GENERATON uIU/mL 0 278*     Results from last 7 days   Lab Units 01/05/21  0622 01/04/21 2052   PROCALCITONIN ng/ml 0 17 0 21     Results from last 7 days   Lab Units 01/04/21 2052   LACTIC ACID mmol/L 1 7     Results from last 7 days   Lab Units 01/04/21 2052   NT-PRO BNP pg/mL 108     Results from last 7 days   Lab Units 01/04/21 2052   FERRITIN ng/mL 1,448*     Results from last 7 days   Lab Units 01/04/21 2052   CRP mg/L 201 0*       Results from last 7 days   Lab Units 01/04/21 2052   BLOOD CULTURE  Received in Microbiology Lab  Culture in Progress  Received in Microbiology Lab  Culture in Progress       Results from last 7 days   Lab Units 01/04/21 2052   TOTAL COUNTED  100           ED Treatment:   Medication Administration from 01/04/2021 1754 to 01/05/2021 1617       Date/Time Order Dose Route Action     01/04/2021 2035 sodium chloride 0 9 % bolus 1,000 mL 1,000 mL Intravenous New Bag     01/04/2021 2049 dexamethasone (DECADRON) injection 6 mg 6 mg Intravenous Given     01/04/2021 2049 albuterol (PROVENTIL HFA,VENTOLIN HFA) inhaler 5 puff 5 puff Inhalation Given     01/04/2021 2105 ceftriaxone (ROCEPHIN) 1 g/50 mL in dextrose IVPB 1,000 mg Intravenous New Bag     01/04/2021 2104 doxycycline hyclate (VIBRAMYCIN) capsule 100 mg 100 mg Oral Given     01/04/2021 2104 acetaminophen (TYLENOL) tablet 650 mg 650 mg Oral Given     01/04/2021 2211 ketorolac (TORADOL) injection 15 mg 15 mg Intravenous Given     01/05/2021 0616 sodium chloride 0 9 % infusion 50 mL/hr Intravenous New Bag     01/05/2021 1230 insulin lispro (HumaLOG) 100 units/mL subcutaneous injection 1-5 Units 1 Units Subcutaneous Given     01/05/2021 0837 insulin lispro (HumaLOG) 100 units/mL subcutaneous injection 1-5 Units 1 Units Subcutaneous Given     01/05/2021 0837 cholecalciferol (VITAMIN D3) tablet 2,000 Units 2,000 Units Oral Given     01/05/2021 0839 ascorbic acid (VITAMIN C) tablet 1,000 mg 1,000 mg Oral Given     01/05/2021 0838 zinc sulfate (ZINCATE) capsule 220 mg 220 mg Oral Given     01/05/2021 0838 famotidine (PEPCID) tablet 20 mg 20 mg Oral Given     01/05/2021 1453 heparin (porcine) subcutaneous injection 5,000 Units 5,000 Units Subcutaneous Given     01/05/2021 0616 heparin (porcine) subcutaneous injection 5,000 Units 5,000 Units Subcutaneous Given     01/05/2021 0837 doxycycline hyclate (VIBRAMYCIN) capsule 100 mg 100 mg Oral Given        History reviewed  No pertinent past medical history    Present on Admission:   Pneumonia due to COVID-19 virus   JUAN JOSE (acute kidney injury) ([facility] [address] )   Diabetes mellitus type 1 (HCC)   Transaminitis   Sepsis ([facility] [address] )   Hypermagnesemia   Adrenal nodule (HCC)   Enlarged thyroid gland      Admitting Diagnosis: Shortness of breath [R06 02]  Age/Sex: 50 y o  male  Admission Orders:  Scheduled Medications:  ascorbic acid, 1,000 mg, Oral, Q12H FAYE  cefTRIAXone, 1,000 mg, Intravenous, Q24H  cholecalciferol, 2,000 Units, Oral, Daily  dexamethasone, 6 mg, Intravenous, Q24H  doxycycline hyclate, 100 mg, Oral, Q12H  famotidine, 20 mg, Oral, BID  heparin (porcine), 5,000 Units, Subcutaneous, Q8H [facility]  insulin lispro, 1-5 Units, Subcutaneous, TID AC  insulin lispro, 1-5 Units, Subcutaneous, HS  zinc sulfate, 220 mg, Oral, Daily    Followed by  Gayle Santizo ON 1/12/2021] multivitamin-minerals, 1 tablet, Oral, Daily      Continuous IV Infusions:    sodium chloride 0 9 % infusion   Rate: 50 mL/hr Dose: 50 mL/hr  Freq: Continuous Route: IV  Indications of Use: IV Hydration  Last Dose: Stopped (01/05/21 1130)  Start: 01/05/21 0545 End: 01/05/21 1115  PRN Meds:  acetaminophen, 650 mg, Oral, Q6H PRN      oob as jerome  Keep sat 90%  diabetic diet    Network Utilization Review Department  ATTENTION: Please call with any questions or concerns to [phone] and carefully listen to the prompts so that you are directed to the right person  All voicemails are confidential   Jarad Varghese all requests for admission clinical reviews, approved or denied determinations and any other requests to dedicated fax number below belonging to the campus where the patient is receiving treatment   List of dedicated fax numbers for the Facilities:  [address] DENIALS (Administrative/Medical Necessity) [phone]   [address] (Maternity/NICU/Pediatrics) [phone]   [address] [place] [address] Dr Merrick Hudson 9365 (  Jessica Arredondo [address]) [address] [address] [place] Singletary 1481 [address] [place] [address] [phone]

## 2021-01-05 NOTE — ASSESSMENT & PLAN NOTE
· Currently meeting criteria due to tachycardia, tachypnea, febrile in setting of COVID-19 pneumonia  · Lactic acid WNL  · Blood culture x2 pending with procalcitonin  · Continue antibiotics  · Continue IV fluid hydration

## 2021-01-05 NOTE — ASSESSMENT & PLAN NOTE
· Slightly elevated at 2 9  · Avoid magnesium supplements  · Provided with NSS IV fluid hydration  · Recheck magnesium in a m    · If no change consider adding dose of Lasix

## 2021-01-06 LAB
ANION GAP SERPL CALCULATED.3IONS-SCNC: 10 MMOL/L (ref 4–13)
BASOPHILS # BLD AUTO: 0.01 THOUSANDS/ΜL (ref 0–0.1)
BASOPHILS NFR BLD AUTO: 0 % (ref 0–1)
BUN SERPL-MCNC: 27 MG/DL (ref 5–25)
CALCIUM SERPL-MCNC: 8.3 MG/DL (ref 8.3–10.1)
CHLORIDE SERPL-SCNC: 102 MMOL/L (ref 100–108)
CO2 SERPL-SCNC: 22 MMOL/L (ref 21–32)
CREAT SERPL-MCNC: 1.03 MG/DL (ref 0.6–1.3)
EOSINOPHIL # BLD AUTO: 0 THOUSAND/ΜL (ref 0–0.61)
EOSINOPHIL NFR BLD AUTO: 0 % (ref 0–6)
ERYTHROCYTE [DISTWIDTH] IN BLOOD BY AUTOMATED COUNT: 12 % (ref 11.6–15.1)
GFR SERPL CREATININE-BSD FRML MDRD: 100 ML/MIN/1.73SQ M
GLUCOSE SERPL-MCNC: 129 MG/DL (ref 65–140)
GLUCOSE SERPL-MCNC: 152 MG/DL (ref 65–140)
GLUCOSE SERPL-MCNC: 204 MG/DL (ref 65–140)
GLUCOSE SERPL-MCNC: 232 MG/DL (ref 65–140)
GLUCOSE SERPL-MCNC: 249 MG/DL (ref 65–140)
HCT VFR BLD AUTO: 36.9 % (ref 36.5–49.3)
HGB BLD-MCNC: 12.1 G/DL (ref 12–17)
IMM GRANULOCYTES # BLD AUTO: 0.06 THOUSAND/UL (ref 0–0.2)
IMM GRANULOCYTES NFR BLD AUTO: 1 % (ref 0–2)
LYMPHOCYTES # BLD AUTO: 1.28 THOUSANDS/ΜL (ref 0.6–4.47)
LYMPHOCYTES NFR BLD AUTO: 15 % (ref 14–44)
MCH RBC QN AUTO: 29.4 PG (ref 26.8–34.3)
MCHC RBC AUTO-ENTMCNC: 32.8 G/DL (ref 31.4–37.4)
MCV RBC AUTO: 90 FL (ref 82–98)
MONOCYTES # BLD AUTO: 0.85 THOUSAND/ΜL (ref 0.17–1.22)
MONOCYTES NFR BLD AUTO: 10 % (ref 4–12)
NEUTROPHILS # BLD AUTO: 6.23 THOUSANDS/ΜL (ref 1.85–7.62)
NEUTS SEG NFR BLD AUTO: 74 % (ref 43–75)
NRBC BLD AUTO-RTO: 0 /100 WBCS
PLATELET # BLD AUTO: 456 THOUSANDS/UL (ref 149–390)
PMV BLD AUTO: 8.8 FL (ref 8.9–12.7)
POTASSIUM SERPL-SCNC: 4.9 MMOL/L (ref 3.5–5.3)
RBC # BLD AUTO: 4.11 MILLION/UL (ref 3.88–5.62)
SODIUM SERPL-SCNC: 134 MMOL/L (ref 136–145)
WBC # BLD AUTO: 8.43 THOUSAND/UL (ref 4.31–10.16)

## 2021-01-06 PROCEDURE — 85025 COMPLETE CBC W/AUTO DIFF WBC: CPT | Performed by: INTERNAL MEDICINE

## 2021-01-06 PROCEDURE — 80048 BASIC METABOLIC PNL TOTAL CA: CPT | Performed by: INTERNAL MEDICINE

## 2021-01-06 PROCEDURE — 82948 REAGENT STRIP/BLOOD GLUCOSE: CPT

## 2021-01-06 PROCEDURE — 99232 SBSQ HOSP IP/OBS MODERATE 35: CPT | Performed by: INTERNAL MEDICINE

## 2021-01-06 RX ADMIN — DEXAMETHASONE SODIUM PHOSPHATE 6 MG: 4 INJECTION INTRA-ARTICULAR; INTRALESIONAL; INTRAMUSCULAR; INTRAVENOUS; SOFT TISSUE at 22:42

## 2021-01-06 RX ADMIN — MELATONIN 3 MG: at 22:30

## 2021-01-06 RX ADMIN — HEPARIN SODIUM 5000 UNITS: 5000 INJECTION INTRAVENOUS; SUBCUTANEOUS at 09:29

## 2021-01-06 RX ADMIN — HEPARIN SODIUM 5000 UNITS: 5000 INJECTION INTRAVENOUS; SUBCUTANEOUS at 22:30

## 2021-01-06 RX ADMIN — FAMOTIDINE 20 MG: 20 TABLET ORAL at 09:28

## 2021-01-06 RX ADMIN — ZINC SULFATE 220 MG (50 MG) CAPSULE 220 MG: CAPSULE at 09:27

## 2021-01-06 RX ADMIN — FAMOTIDINE 20 MG: 20 TABLET ORAL at 18:10

## 2021-01-06 RX ADMIN — HEPARIN SODIUM 5000 UNITS: 5000 INJECTION INTRAVENOUS; SUBCUTANEOUS at 18:08

## 2021-01-06 RX ADMIN — INSULIN LISPRO 1 UNITS: 100 INJECTION, SOLUTION INTRAVENOUS; SUBCUTANEOUS at 22:32

## 2021-01-06 RX ADMIN — ACETAMINOPHEN 650 MG: 325 TABLET, FILM COATED ORAL at 22:31

## 2021-01-06 RX ADMIN — Medication 2000 UNITS: at 09:28

## 2021-01-06 RX ADMIN — OXYCODONE HYDROCHLORIDE AND ACETAMINOPHEN 1000 MG: 500 TABLET ORAL at 22:00

## 2021-01-06 RX ADMIN — CEFTRIAXONE SODIUM 1000 MG: 10 INJECTION, POWDER, FOR SOLUTION INTRAVENOUS at 22:00

## 2021-01-06 RX ADMIN — DOXYCYCLINE 100 MG: 100 CAPSULE ORAL at 22:00

## 2021-01-06 RX ADMIN — DOXYCYCLINE 100 MG: 100 CAPSULE ORAL at 09:27

## 2021-01-06 RX ADMIN — INSULIN LISPRO 2 UNITS: 100 INJECTION, SOLUTION INTRAVENOUS; SUBCUTANEOUS at 13:40

## 2021-01-06 RX ADMIN — OXYCODONE HYDROCHLORIDE AND ACETAMINOPHEN 1000 MG: 500 TABLET ORAL at 09:28

## 2021-01-06 RX ADMIN — INSULIN LISPRO 1 UNITS: 100 INJECTION, SOLUTION INTRAVENOUS; SUBCUTANEOUS at 18:11

## 2021-01-06 NOTE — ASSESSMENT & PLAN NOTE
· Creatinine peaked 1 41  · Denies history of CKD, no baseline per review of chart  · Now 1 0 which is adequate for him  · Monitor

## 2021-01-06 NOTE — PROGRESS NOTES
Progress Note - Linh Grover 1975, 50 y o  male MRN: [id number]    Unit/Bed#: -01 Encounter: [id number]    Primary Care Provider: No primary care provider on file  Date and time admitted to hospital: 1/4/2021  8:02 PM        Enlarged thyroid gland  Assessment & Plan  · Incidental on CT a  · Check TSH    Adrenal nodule ([facility] )  Assessment & Plan  · Incidental right adrenal nodule noted on CT a  · Per radiology follow-up in 1 year, please let patient know on discharge    Hypermagnesemia  Assessment & Plan  · Slightly elevated at 2 9  · Avoid magnesium supplements  · Provided with NSS IV fluid hydration      Sepsis ([facility] )  Assessment & Plan  · Currently meeting criteria due to tachycardia, tachypnea, febrile in setting of COVID-19 pneumonia  · Lactic acid WNL  · Blood culture x2 ordered, follow  · Continue antibiotics  · Continue IV fluid hydration    See plan under COVID PNM    Transaminitis  Assessment & Plan  · Most likely in setting COVID-19 pneumonia  · Monitor CMP  · Attempt to avoid hepatotoxic agents, though patient has been febrile and with JUAN JOSE will provide with p r n  1 dose Tylenol    Diabetes mellitus type 1 [facility])  Assessment & Plan  Lab Results   Component Value Date    HGBA1C 10 5 (H) 01/05/2021       Recent Labs     01/05/21  1626 01/05/21  2138 01/06/21  0830 01/06/21  1111   POCGLU 164* 180* 129 232*       Blood Sugar Average: Last 72 hrs:  · (P) 265 [id number] Check A1c  · Blood glucose checks q i d , hypoglycemia protocol  · Will hold home metformin  · Sliding scale insulin      JUAN JOSE (acute kidney injury) ([facility] )  Assessment & Plan  · Creatinine peaked 1 41  · Denies history of CKD, no baseline per review of chart  · Now 1 0 which is adequate for him  · Monitor      * Pneumonia due to COVID-19 virus  Assessment & Plan  · Reports being diagnosed on 12/23 with COVID-19  · Since this past Sunday has reported feeling increased generalized weakness, fatigue, shortness of breath with productive cough  · Will follow mild COVID protocol as patient requiring 2 L nasal cannula which will continue  Chest x-ray revealing COVID pneumonia  · Due to being diagnosed 2 weeks ago with COVID-19, will hold off on IV remdesivir    Plan:  Continue vitamin-C, D, zinc  Continue IV Decadron 6 mg q d  Started in ED  Continue IV ceftriaxone 1 g q d  And doxycycline p o  100 mg q 12 hours started in ED  Monitor clinically, ventilatory status    · CT A negative for PE, revealing ground-glass COVID pneumonia appearance  · D-dimer elevated at 1 44, negative for PE, continue to trend      VTE Pharmacologic Prophylaxis:   Pharmacologic: Heparin  Mechanical VTE Prophylaxis in Place: Yes    Patient Centered Rounds: I have performed bedside rounds with nursing staff today  Education and Discussions with Family / Patient: Patient himself    Time Spent for Care: 30 minutes  More than 50% of total time spent on counseling and coordination of care as described above  Current Length of Stay: 2 day(s)    Current Patient Status: Inpatient   Certification Statement: The patient will continue to require additional inpatient hospital stay due to need for [address] treatment    Discharge Plan: Once stable    Code Status: Level 1 - Full Code      Subjective:     Patient evaluated this morning  Fairly anxious, diaphoretic, depressed also, says that he cries easily  Denies nausea, vomiting, diarrhea, constipation  No other events reported  Objective:     Vitals:   Temp (24hrs), Av 9 °F (36 6 °C), Min:97 7 °F (36 5 °C), Max:98 1 °F (36 7 °C)    Temp:  [97 7 °F (36 5 °C)-98 1 °F (36 7 °C)] 97 7 °F (36 5 °C)  HR:  [62-94] 62  Resp:  [18] 18  BP: (128-150)/(77-84) 150/84  SpO2:  [90 %-96 %] 95 %  Body mass index is 25 2 kg/m²  Input and Output Summary (last 24 hours):        Intake/Output Summary (Last 24 hours) at 2021 1445  Last data filed at 2021 0036  Gross per 24 hour   Intake 24 ml   Output    Net 24 ml Physical Exam:     Physical Exam  Vitals signs and nursing note reviewed  Constitutional:       Appearance: Normal appearance  Comments: Middle age male in bed, awake  HENT:      Head: Normocephalic and atraumatic  Right Ear: External ear normal       Left Ear: External ear normal       Nose: Nose normal  No congestion or rhinorrhea  Mouth/Throat:      Mouth: Mucous membranes are moist       Pharynx: Oropharynx is clear  No oropharyngeal exudate or posterior oropharyngeal erythema  Eyes:      General: No scleral icterus  Right eye: No discharge  Left eye: No discharge  Pupils: Pupils are equal, round, and reactive to light  Neck:      Musculoskeletal: Normal range of motion  No neck rigidity or muscular tenderness  Vascular: No carotid bruit  Cardiovascular:      Rate and Rhythm: Normal rate and regular rhythm  Pulses: Normal pulses  Heart sounds: No murmur  No friction rub  No gallop  Pulmonary:      Effort: Pulmonary effort is normal  No respiratory distress  Breath sounds: Normal breath sounds  No stridor  No wheezing, rhonchi or rales  Abdominal:      General: Abdomen is flat  Bowel sounds are normal  There is no distension  Palpations: Abdomen is soft  There is no mass  Tenderness: There is no abdominal tenderness  There is no guarding or rebound  Hernia: No hernia is present  Musculoskeletal: Normal range of motion  General: No swelling, tenderness, deformity or signs of injury  Lymphadenopathy:      Cervical: No cervical adenopathy  Skin:     General: Skin is warm and dry  Capillary Refill: Capillary refill takes less than 2 seconds  Coloration: Skin is not jaundiced or pale  Findings: No bruising or erythema  Neurological:      General: No focal deficit present  Mental Status: He is alert and oriented to person, place, and time  Mental status is at baseline  Cranial Nerves:  No cranial nerve deficit  Sensory: No sensory deficit  Motor: No weakness  Coordination: Coordination normal       Deep Tendon Reflexes: Reflexes normal    Psychiatric:         Mood and Affect: Mood normal          Behavior: Behavior normal          Thought Content: Thought content normal          Judgment: Judgment normal            Additional Data:     Labs:    Results from last 7 days   Lab Units 01/06/21  1000  01/04/21 2052   WBC Thousand/uL 8 43   < > 7 38   HEMOGLOBIN g/dL 12 1   < > 13 5   HEMATOCRIT % 36 9   < > 40 9   PLATELETS Thousands/uL 456*   < > 425*   BANDS PCT %  --   --  1   NEUTROS PCT % 74  --   --    LYMPHS PCT % 15  --   --    LYMPHO PCT %  --   --  8*   MONOS PCT % 10  --   --    MONO PCT %  --   --  11   EOS PCT % 0  --  0    < > = values in this interval not displayed  Results from last 7 days   Lab Units 01/06/21  1000 01/05/21  0617   SODIUM mmol/L 134* 139   POTASSIUM mmol/L 4 9 4 8   CHLORIDE mmol/L 102 103   CO2 mmol/L 22 25   BUN mg/dL 27* 20   CREATININE mg/dL 1 03 1 19   ANION GAP mmol/L 10 11   CALCIUM mg/dL 8 3 8 6   ALBUMIN g/dL  --  2 3*   TOTAL BILIRUBIN mg/dL  --  0 50   ALK PHOS U/L  --  140*   ALT U/L  --  86*   AST U/L  --  50*   GLUCOSE RANDOM mg/dL 249* 186*     Results from last 7 days   Lab Units 01/04/21 2052   INR  1 02     Results from last 7 days   Lab Units 01/06/21  1111 01/06/21  0830 01/05/21  2138 01/05/21  1626 01/05/21  1226 01/05/21  0829   POC GLUCOSE mg/dl 232* 129 180* 164* 207* 188*     Results from last 7 days   Lab Units 01/05/21  0617   HEMOGLOBIN A1C % 10 5*     Results from last 7 days   Lab Units 01/05/21  0622 01/04/21 2052   LACTIC ACID mmol/L  --  1 7   PROCALCITONIN ng/ml 0 17 0 21           * I Have Reviewed All Lab Data Listed Above  * Additional Pertinent Lab Tests Reviewed:  [facility] Admission Reviewed  Recent Cultures (last 7 days):     Results from last 7 days   Lab Units 01/04/21 2052   BLOOD CULTURE  No Growth at 24 hrs  No Growth at 24 hrs  Last 24 Hours Medication List:   Current Facility-Administered Medications   Medication Dose Route Frequency Provider Last Rate    acetaminophen  650 mg Oral Q6H PRN Nilda Antony PA-C      ascorbic acid  1,000 mg Oral Q12H [facility] Nilda Antony PA-C      cefTRIAXone  1,000 mg Intravenous Q24H PILI SzymanskiC 1,000 mg (01/05/21 2203)    cholecalciferol  2,000 Units Oral Daily Nilda Antony PA-C      dexamethasone  6 mg Intravenous Q24H Nilda Antony PA-C      doxycycline hyclate  100 mg Oral Q12H Nilda Antony PA-C      famotidine  20 mg Oral BID Nilda Antony PA-C      heparin (porcine)  5,000 Units Subcutaneous Q8H [facility] Nilda Antony PA-C      insulin lispro  1-5 Units Subcutaneous TID AC Nilda Antony PA-C      insulin lispro  1-5 Units Subcutaneous HS Nilda Antony PA-C      melatonin  3 mg Oral HS NEUSIEDLRUSTY      zinc sulfate  220 mg Oral Daily Nilda Antony PA-C      Followed by   Robina Denton ON 1/12/2021] multivitamin-minerals  1 tablet Oral Daily Nilda Antony PA-C          Today, Patient Was Seen By: Malik Anne MD    ** Please Note: Dictation voice to text software may have been used in the creation of this document   **

## 2021-01-06 NOTE — ASSESSMENT & PLAN NOTE
· Most likely in setting COVID-19 pneumonia  · Monitor CMP  · Attempt to avoid hepatotoxic agents, though patient has been febrile and with JUAN JOSE will provide with p r n  1 dose Tylenol

## 2021-01-06 NOTE — ASSESSMENT & PLAN NOTE
· Reports being diagnosed on 12/23 with COVID-19  · Since this past Sunday has reported feeling increased generalized weakness, fatigue, shortness of breath with productive cough  · Will follow mild COVID protocol as patient requiring 2 L nasal cannula which will continue  Chest x-ray revealing COVID pneumonia  · Due to being diagnosed 2 weeks ago with COVID-19, will hold off on IV remdesivir    Plan:  Continue vitamin-C, D, zinc  Continue IV Decadron 6 mg q d  Started in ED  Continue IV ceftriaxone 1 g q d   And doxycycline p o  100 mg q 12 hours started in ED  Monitor clinically, ventilatory status    · CT A negative for PE, revealing ground-glass COVID pneumonia appearance  · D-dimer elevated at 1 44, negative for PE, continue to trend

## 2021-01-06 NOTE — ASSESSMENT & PLAN NOTE
· Currently meeting criteria due to tachycardia, tachypnea, febrile in setting of COVID-19 pneumonia  · Lactic acid WNL  · Blood culture x2 ordered, follow  · Continue antibiotics  · Continue IV fluid hydration    See plan under COVID [facility]

## 2021-01-07 PROBLEM — H53.19 VISUAL DISTORTION: Status: ACTIVE | Noted: 2021-01-07

## 2021-01-07 LAB
ANION GAP SERPL CALCULATED.3IONS-SCNC: 9 MMOL/L (ref 4–13)
ANION GAP SERPL CALCULATED.3IONS-SCNC: 9 MMOL/L (ref 4–13)
BASOPHILS # BLD AUTO: 0.01 THOUSANDS/ΜL (ref 0–0.1)
BASOPHILS NFR BLD AUTO: 0 % (ref 0–1)
BUN SERPL-MCNC: 25 MG/DL (ref 5–25)
BUN SERPL-MCNC: 27 MG/DL (ref 5–25)
CALCIUM SERPL-MCNC: 8.7 MG/DL (ref 8.3–10.1)
CALCIUM SERPL-MCNC: 9.1 MG/DL (ref 8.3–10.1)
CHLORIDE SERPL-SCNC: 100 MMOL/L (ref 100–108)
CHLORIDE SERPL-SCNC: 101 MMOL/L (ref 100–108)
CO2 SERPL-SCNC: 23 MMOL/L (ref 21–32)
CO2 SERPL-SCNC: 24 MMOL/L (ref 21–32)
CREAT SERPL-MCNC: 1.04 MG/DL (ref 0.6–1.3)
CREAT SERPL-MCNC: 1.04 MG/DL (ref 0.6–1.3)
EOSINOPHIL # BLD AUTO: 0.01 THOUSAND/ΜL (ref 0–0.61)
EOSINOPHIL NFR BLD AUTO: 0 % (ref 0–6)
ERYTHROCYTE [DISTWIDTH] IN BLOOD BY AUTOMATED COUNT: 11.9 % (ref 11.6–15.1)
GFR SERPL CREATININE-BSD FRML MDRD: 98 ML/MIN/1.73SQ M
GFR SERPL CREATININE-BSD FRML MDRD: 98 ML/MIN/1.73SQ M
GLUCOSE SERPL-MCNC: 186 MG/DL (ref 65–140)
GLUCOSE SERPL-MCNC: 197 MG/DL (ref 65–140)
GLUCOSE SERPL-MCNC: 228 MG/DL (ref 65–140)
GLUCOSE SERPL-MCNC: 229 MG/DL (ref 65–140)
GLUCOSE SERPL-MCNC: 236 MG/DL (ref 65–140)
GLUCOSE SERPL-MCNC: 292 MG/DL (ref 65–140)
HCT VFR BLD AUTO: 42 % (ref 36.5–49.3)
HGB BLD-MCNC: 13.6 G/DL (ref 12–17)
IMM GRANULOCYTES # BLD AUTO: 0.08 THOUSAND/UL (ref 0–0.2)
IMM GRANULOCYTES NFR BLD AUTO: 1 % (ref 0–2)
LYMPHOCYTES # BLD AUTO: 1 THOUSANDS/ΜL (ref 0.6–4.47)
LYMPHOCYTES NFR BLD AUTO: 14 % (ref 14–44)
MCH RBC QN AUTO: 29.6 PG (ref 26.8–34.3)
MCHC RBC AUTO-ENTMCNC: 32.4 G/DL (ref 31.4–37.4)
MCV RBC AUTO: 91 FL (ref 82–98)
MONOCYTES # BLD AUTO: 0.51 THOUSAND/ΜL (ref 0.17–1.22)
MONOCYTES NFR BLD AUTO: 7 % (ref 4–12)
NEUTROPHILS # BLD AUTO: 5.39 THOUSANDS/ΜL (ref 1.85–7.62)
NEUTS SEG NFR BLD AUTO: 78 % (ref 43–75)
NRBC BLD AUTO-RTO: 0 /100 WBCS
PLATELET # BLD AUTO: 527 THOUSANDS/UL (ref 149–390)
PMV BLD AUTO: 8.7 FL (ref 8.9–12.7)
POTASSIUM SERPL-SCNC: 5.1 MMOL/L (ref 3.5–5.3)
POTASSIUM SERPL-SCNC: 6 MMOL/L (ref 3.5–5.3)
RBC # BLD AUTO: 4.6 MILLION/UL (ref 3.88–5.62)
SODIUM SERPL-SCNC: 133 MMOL/L (ref 136–145)
SODIUM SERPL-SCNC: 133 MMOL/L (ref 136–145)
WBC # BLD AUTO: 7 THOUSAND/UL (ref 4.31–10.16)

## 2021-01-07 PROCEDURE — 85025 COMPLETE CBC W/AUTO DIFF WBC: CPT | Performed by: INTERNAL MEDICINE

## 2021-01-07 PROCEDURE — 99232 SBSQ HOSP IP/OBS MODERATE 35: CPT | Performed by: INTERNAL MEDICINE

## 2021-01-07 PROCEDURE — 80048 BASIC METABOLIC PNL TOTAL CA: CPT | Performed by: INTERNAL MEDICINE

## 2021-01-07 PROCEDURE — 82948 REAGENT STRIP/BLOOD GLUCOSE: CPT

## 2021-01-07 RX ORDER — BENZONATATE 100 MG/1
100 CAPSULE ORAL 3 TIMES DAILY PRN
Status: DISCONTINUED | OUTPATIENT
Start: 2021-01-07 | End: 2021-01-10

## 2021-01-07 RX ORDER — SODIUM POLYSTYRENE SULFONATE 4.1 MEQ/G
15 POWDER, FOR SUSPENSION ORAL; RECTAL ONCE
Status: COMPLETED | OUTPATIENT
Start: 2021-01-07 | End: 2021-01-07

## 2021-01-07 RX ORDER — BUTALBITAL, ACETAMINOPHEN AND CAFFEINE 50; 325; 40 MG/1; MG/1; MG/1
1 TABLET ORAL EVERY 4 HOURS PRN
Status: COMPLETED | OUTPATIENT
Start: 2021-01-07 | End: 2021-01-07

## 2021-01-07 RX ADMIN — FAMOTIDINE 20 MG: 20 TABLET ORAL at 08:19

## 2021-01-07 RX ADMIN — HEPARIN SODIUM 5000 UNITS: 5000 INJECTION INTRAVENOUS; SUBCUTANEOUS at 14:42

## 2021-01-07 RX ADMIN — ZINC SULFATE 220 MG (50 MG) CAPSULE 220 MG: CAPSULE at 08:19

## 2021-01-07 RX ADMIN — INSULIN LISPRO 1 UNITS: 100 INJECTION, SOLUTION INTRAVENOUS; SUBCUTANEOUS at 16:44

## 2021-01-07 RX ADMIN — INSULIN LISPRO 1 UNITS: 100 INJECTION, SOLUTION INTRAVENOUS; SUBCUTANEOUS at 12:12

## 2021-01-07 RX ADMIN — OXYCODONE HYDROCHLORIDE AND ACETAMINOPHEN 1000 MG: 500 TABLET ORAL at 22:50

## 2021-01-07 RX ADMIN — FAMOTIDINE 20 MG: 20 TABLET ORAL at 18:43

## 2021-01-07 RX ADMIN — DOXYCYCLINE 100 MG: 100 CAPSULE ORAL at 22:50

## 2021-01-07 RX ADMIN — INSULIN LISPRO 2 UNITS: 100 INJECTION, SOLUTION INTRAVENOUS; SUBCUTANEOUS at 08:19

## 2021-01-07 RX ADMIN — DOXYCYCLINE 100 MG: 100 CAPSULE ORAL at 08:19

## 2021-01-07 RX ADMIN — DEXAMETHASONE SODIUM PHOSPHATE 6 MG: 4 INJECTION INTRA-ARTICULAR; INTRALESIONAL; INTRAMUSCULAR; INTRAVENOUS; SOFT TISSUE at 22:51

## 2021-01-07 RX ADMIN — OXYCODONE HYDROCHLORIDE AND ACETAMINOPHEN 1000 MG: 500 TABLET ORAL at 08:19

## 2021-01-07 RX ADMIN — Medication 2000 UNITS: at 08:19

## 2021-01-07 RX ADMIN — MELATONIN 3 MG: at 22:50

## 2021-01-07 RX ADMIN — HEPARIN SODIUM 5000 UNITS: 5000 INJECTION INTRAVENOUS; SUBCUTANEOUS at 06:05

## 2021-01-07 RX ADMIN — ENOXAPARIN SODIUM 80 MG: 80 INJECTION SUBCUTANEOUS at 22:52

## 2021-01-07 RX ADMIN — INSULIN LISPRO 3 UNITS: 100 INJECTION, SOLUTION INTRAVENOUS; SUBCUTANEOUS at 22:53

## 2021-01-07 RX ADMIN — CEFTRIAXONE SODIUM 1000 MG: 10 INJECTION, POWDER, FOR SOLUTION INTRAVENOUS at 22:56

## 2021-01-07 RX ADMIN — BUTALBITAL, ACETAMINOPHEN AND CAFFEINE 1 TABLET: 50; 325; 40 TABLET ORAL at 02:30

## 2021-01-07 RX ADMIN — SODIUM POLYSTYRENE SULFONATE 15 G: 1 POWDER ORAL; RECTAL at 10:00

## 2021-01-07 NOTE — ASSESSMENT & PLAN NOTE
· Reports being diagnosed on 12/23 with COVID-19  · Since this past Sunday has reported feeling increased generalized weakness, fatigue, shortness of breath with productive cough  · Will follow mild COVID protocol as patient requiring 2 L nasal cannula which will continue  Chest x-ray revealing COVID pneumonia  · Due to being diagnosed 2 weeks ago with COVID-19, will hold off on IV remdesivir  · CT A negative for PE, revealing ground-glass COVID pneumonia appearance  · D-dimer elevated at 1 44, negative for PE, continue to trend    Patient is slowly improving although his overall anxious and tired about the process  Plan:  Continue vitamin-C, D, zinc  Continue IV Decadron 6 mg q d  Started in ED  Continue IV ceftriaxone 1 g q d   And doxycycline p o  100 mg q 12 hours   Continue to Monitor clinically, ventilatory status

## 2021-01-07 NOTE — PROGRESS NOTES
Patient ambulating in room with out O2  Pulse ox 83% RA    This nurse told this  patient needs to use o2 with ambulation because of such  Also that his lungs and heart are  working harder  Patient agreed to wear, but is noncompliant   O2 Sat at rest 94-95% on RA

## 2021-01-07 NOTE — ASSESSMENT & PLAN NOTE
· Incidental right adrenal nodule noted on CT a  · Per radiology follow-up in 1 year, please let patient know on discharge    Also noted hyponatremia and hyperkalemia  Will check cortisol level    Give Kayexalate for hyperkalemia

## 2021-01-07 NOTE — SOCIAL WORK
CM was in contact with Pt's emergency contact/girlfriend Shalini Boyd [phone] with an introduction and explanation of role  Shalini Boyd reported the Pt resides with his mother Jacky Muller in a multilevel home with no use of DME, 4 steps from garage and 7 steps from front door to enter  Pt was independent with ADLs PTA, is self employed as a contractor, no hx of [address], mental health or drug/alcohol placements  Pt was reported not having a living will, not sure of a local pharmacy and no PCP  CM reviewed d/c planning process including the following: identifying help at home, patient preference for d/c planning needs, Discharge Lounge, [facility] Meds to Bed program, availability of treatment team to discuss questions or concerns patient and/or family may have regarding understanding medications and recognizing signs and symptoms once discharged  CM also encouraged patient to follow up with all recommended appointments after discharge  Patient advised of importance for patient and family to participate in managing patients medical well being

## 2021-01-07 NOTE — ASSESSMENT & PLAN NOTE
· Currently meeting criteria due to tachycardia, tachypnea, febrile in setting of COVID-19 pneumonia  · Lactic acid WNL  · Blood cultures negative  · Continue antibiotics  · Continue IV fluid hydration    See plan under COVID [facility]

## 2021-01-07 NOTE — PROGRESS NOTES
Progress Note - Kesha Enter 1975, 50 y o  male MRN: [id number]    Unit/Bed#: -01 Encounter: [id number]    Primary Care Provider: No primary care provider on file  Date and time admitted to hospital: 1/4/2021  8:02 PM        Enlarged thyroid gland  Assessment & Plan  · Incidental on CT outpatient follow-up  Adrenal nodule ([facility])  Assessment & Plan  · Incidental right adrenal nodule noted on CT a  · Per radiology follow-up in 1 year, please let patient know on discharge    Also noted hyponatremia and hyperkalemia  Will check cortisol level  Give Kayexalate for hyperkalemia    Sepsis ([facility] )  Assessment & Plan  · Currently meeting criteria due to tachycardia, tachypnea, febrile in setting of COVID-19 pneumonia  · Lactic acid WNL  · Blood cultures negative  · Continue antibiotics  · Continue IV fluid hydration    See plan under COVID PNM    Transaminitis  Assessment & Plan  · Most likely in setting COVID-19 pneumonia  · Monitor CMP    Diabetes mellitus type 1 [facility])  Assessment & Plan  Lab Results   Component Value Date    HGBA1C 10 5 (H) 01/05/2021       Recent Labs     01/06/21  1650 01/06/21  2052 01/07/21  0542 01/07/21  1101   POCGLU 152* 204* 236* 197*       Blood Sugar Average: Last 72 hrs:  · (P) 188  9 Check A1c  · Blood glucose checks q i d , hypoglycemia protocol  · Will hold home metformin  · Sliding scale insulin      JUAN JOSE (acute kidney injury) ([facility] )  Assessment & Plan  · Creatinine peaked 1 41  · Denies history of CKD, no baseline per review of chart  · Now 1 0 which is adequate for him  · Monitor      * Pneumonia due to COVID-19 virus  Assessment & Plan  · Reports being diagnosed on 12/23 with COVID-19  · Since this past Sunday has reported feeling increased generalized weakness, fatigue, shortness of breath with productive cough  · Will follow mild COVID protocol as patient requiring 2 L nasal cannula which will continue  Chest x-ray revealing COVID pneumonia  · Due to being diagnosed 2 weeks ago with COVID-19, will hold off on IV remdesivir  · CT A negative for PE, revealing ground-glass COVID pneumonia appearance  · D-dimer elevated at 1 44, negative for PE, continue to trend    Patient is slowly improving although his overall anxious and tired about the process  Plan:  Continue vitamin-C, D, zinc  Continue IV Decadron 6 mg q d  Started in ED  Continue IV ceftriaxone 1 g q d  And doxycycline p o  100 mg q 12 hours   Continue to Monitor clinically, ventilatory status      VTE Pharmacologic Prophylaxis:   Pharmacologic: Enoxaparin (Lovenox)  Mechanical VTE Prophylaxis in Place: Yes    Patient Centered Rounds: I have performed bedside rounds with nursing staff today  Education and Discussions with Family / Patient:  Discussed with patient self    Time Spent for Care: 30 minutes  More than 50% of total time spent on counseling and coordination of care as described above  Current Length of Stay: 3 day(s)    Current Patient Status: Inpatient   Certification Statement: The patient will continue to require additional inpatient hospital stay due to Need for COVID-19 treatment  Discharge Plan:  Once stable    Code Status: Level 1 - Full Code      Subjective:     Patient evaluated this morning  He is frustrated, tired, anxious  Does not like heparin  Otherwise denies nausea, vomiting, diarrhea constipation  Is in room air  No other events reported  Objective:     Vitals:   Temp (24hrs), Av 8 °F (36 6 °C), Min:97 4 °F (36 3 °C), Max:98 °F (36 7 °C)    Temp:  [97 4 °F (36 3 °C)-98 °F (36 7 °C)] 97 4 °F (36 3 °C)  HR:  [56-63] 57  Resp:  [18] 18  BP: (130-142)/() 130/81  SpO2:  [93 %-98 %] 94 %  Body mass index is 25 2 kg/m²  Input and Output Summary (last 24 hours):        Intake/Output Summary (Last 24 hours) at 2021 1449  Last data filed at 2021 1443  Gross per 24 hour   Intake 1200 ml   Output 200 ml   Net 1000 ml       Physical Exam:     Physical Exam  Vitals signs and nursing note reviewed  Constitutional:       Appearance: Normal appearance  Comments: Middle-age male in bed, awake   HENT:      Head: Normocephalic and atraumatic  Right Ear: External ear normal       Left Ear: External ear normal       Nose: Nose normal  No congestion or rhinorrhea  Mouth/Throat:      Mouth: Mucous membranes are moist       Pharynx: Oropharynx is clear  No oropharyngeal exudate or posterior oropharyngeal erythema  Eyes:      General: No scleral icterus  Right eye: No discharge  Left eye: No discharge  Pupils: Pupils are equal, round, and reactive to light  Neck:      Musculoskeletal: Normal range of motion  No neck rigidity or muscular tenderness  Vascular: No carotid bruit  Cardiovascular:      Rate and Rhythm: Normal rate and regular rhythm  Pulses: Normal pulses  Heart sounds: No murmur  No friction rub  No gallop  Pulmonary:      Effort: Pulmonary effort is normal  No respiratory distress  Breath sounds: Normal breath sounds  No stridor  No wheezing, rhonchi or rales  Abdominal:      General: Abdomen is flat  Bowel sounds are normal  There is no distension  Palpations: Abdomen is soft  There is no mass  Tenderness: There is no abdominal tenderness  There is no guarding or rebound  Hernia: No hernia is present  Musculoskeletal: Normal range of motion  General: No swelling, tenderness, deformity or signs of injury  Lymphadenopathy:      Cervical: No cervical adenopathy  Skin:     General: Skin is warm and dry  Capillary Refill: Capillary refill takes less than 2 seconds  Coloration: Skin is not jaundiced or pale  Findings: No bruising or erythema  Neurological:      General: No focal deficit present  Mental Status: He is alert and oriented to person, place, and time  Mental status is at baseline  Cranial Nerves: No cranial nerve deficit        Sensory: No sensory deficit  Motor: No weakness  Coordination: Coordination normal       Deep Tendon Reflexes: Reflexes normal    Psychiatric:      Comments: Anxious and tired           Additional Data:     Labs:    Results from last 7 days   Lab Units 01/07/21  0539  01/04/21 2052   WBC Thousand/uL 7 00   < > 7 38   HEMOGLOBIN g/dL 13 6   < > 13 5   HEMATOCRIT % 42 0   < > 40 9   PLATELETS Thousands/uL 527*   < > 425*   BANDS PCT %  --   --  1   NEUTROS PCT % 78*   < >  --    LYMPHS PCT % 14   < >  --    LYMPHO PCT %  --   --  8*   MONOS PCT % 7   < >  --    MONO PCT %  --   --  11   EOS PCT % 0   < > 0    < > = values in this interval not displayed  Results from last 7 days   Lab Units 01/07/21  1404  01/05/21  0617   SODIUM mmol/L 133*   < > 139   POTASSIUM mmol/L 5 1   < > 4 8   CHLORIDE mmol/L 100   < > 103   CO2 mmol/L 24   < > 25   BUN mg/dL 27*   < > 20   CREATININE mg/dL 1 04   < > 1 19   ANION GAP mmol/L 9   < > 11   CALCIUM mg/dL 8 7   < > 8 6   ALBUMIN g/dL  --   --  2 3*   TOTAL BILIRUBIN mg/dL  --   --  0 50   ALK PHOS U/L  --   --  140*   ALT U/L  --   --  86*   AST U/L  --   --  50*   GLUCOSE RANDOM mg/dL 229*   < > 186*    < > = values in this interval not displayed  Results from last 7 days   Lab Units 01/04/21 2052   INR  1 02     Results from last 7 days   Lab Units 01/07/21  1101 01/07/21  0542 01/06/21 2052 01/06/21  1650 01/06/21  1111 01/06/21  0830 01/05/21  2138 01/05/21  1626 01/05/21  1226 01/05/21  0829   POC GLUCOSE mg/dl 197* 236* 204* 152* 232* 129 180* 164* 207* 188*     Results from last 7 days   Lab Units 01/05/21  0617   HEMOGLOBIN A1C % 10 5*     Results from last 7 days   Lab Units 01/05/21 0622 01/04/21 2052   LACTIC ACID mmol/L  --  1 7   PROCALCITONIN ng/ml 0 17 0 21           * I Have Reviewed All Lab Data Listed Above  * Additional Pertinent Lab Tests Reviewed:  [facility]      Recent Cultures (last 7 days):     Results from last 7 days   Lab Units 01/04/21 2052   BLOOD CULTURE  No Growth at 48 hrs  No Growth at 48 hrs  Last 24 Hours Medication List:   Current Facility-Administered Medications   Medication Dose Route Frequency Provider Last Rate    ascorbic acid  1,000 mg Oral Q12H [address] Tisha Matamoros PA-C      cefTRIAXone  1,000 mg Intravenous Q24H PILI OjedaC 1,000 mg (01/06/21 2200)    cholecalciferol  2,000 Units Oral Daily Tisha Matamoros PA-C      dexamethasone  6 mg Intravenous Q24H Tisha Matamoros PA-C      doxycycline hyclate  100 mg Oral Q12H Tisha Matamoros PA-C      [START ON 1/8/2021] enoxaparin  40 mg Subcutaneous Q24H [address] RODRIGUEZ Lee MD      famotidine  20 mg Oral BID Tisha Matamoros PA-C      insulin lispro  1-5 Units Subcutaneous TID AC Tisha Matamoros PA-C      insulin lispro  1-5 Units Subcutaneous HS Tisha Matamoros PA-C      melatonin  3 mg Oral HS Nataliia Tubbs PA-C      zinc sulfate  220 mg Oral Daily Tisha Matamoros PA-C      Followed by   Sebastian Bermudez ON 1/12/2021] multivitamin-minerals  1 tablet Oral Daily Tisha Matamoros PA-C          Today, Patient Was Seen By: Chelly Toure MD    ** Please Note: Dictation voice to text software may have been used in the creation of this document   **

## 2021-01-07 NOTE — ASSESSMENT & PLAN NOTE
Lab Results   Component Value Date    HGBA1C 10 5 (H) 01/05/2021       Recent Labs     01/06/21  1650 01/06/21 2052 01/07/21  0542 01/07/21  1101   POCGLU 152* 204* 236* 197*       Blood Sugar Average: Last 72 hrs:  · (P) 188  9 Check A1c  · Blood glucose checks q i d , hypoglycemia protocol  · Will hold home metformin  · Sliding scale insulin

## 2021-01-08 LAB
ALBUMIN SERPL BCP-MCNC: 2.3 G/DL (ref 3.5–5)
ALP SERPL-CCNC: 103 U/L (ref 46–116)
ALT SERPL W P-5'-P-CCNC: 53 U/L (ref 12–78)
ANION GAP SERPL CALCULATED.3IONS-SCNC: 9 MMOL/L (ref 4–13)
AST SERPL W P-5'-P-CCNC: 21 U/L (ref 5–45)
BASOPHILS # BLD AUTO: 0.01 THOUSANDS/ΜL (ref 0–0.1)
BASOPHILS NFR BLD AUTO: 0 % (ref 0–1)
BILIRUB SERPL-MCNC: 0.4 MG/DL (ref 0.2–1)
BUN SERPL-MCNC: 22 MG/DL (ref 5–25)
CALCIUM ALBUM COR SERPL-MCNC: 10.3 MG/DL (ref 8.3–10.1)
CALCIUM SERPL-MCNC: 8.9 MG/DL (ref 8.3–10.1)
CHLORIDE SERPL-SCNC: 99 MMOL/L (ref 100–108)
CO2 SERPL-SCNC: 26 MMOL/L (ref 21–32)
CORTIS SERPL-MCNC: 3.2 UG/DL
CREAT SERPL-MCNC: 1.17 MG/DL (ref 0.6–1.3)
EOSINOPHIL # BLD AUTO: 0.01 THOUSAND/ΜL (ref 0–0.61)
EOSINOPHIL NFR BLD AUTO: 0 % (ref 0–6)
ERYTHROCYTE [DISTWIDTH] IN BLOOD BY AUTOMATED COUNT: 11.9 % (ref 11.6–15.1)
GFR SERPL CREATININE-BSD FRML MDRD: 85 ML/MIN/1.73SQ M
GLUCOSE SERPL-MCNC: 194 MG/DL (ref 65–140)
GLUCOSE SERPL-MCNC: 202 MG/DL (ref 65–140)
GLUCOSE SERPL-MCNC: 239 MG/DL (ref 65–140)
GLUCOSE SERPL-MCNC: 246 MG/DL (ref 65–140)
GLUCOSE SERPL-MCNC: 263 MG/DL (ref 65–140)
GLUCOSE SERPL-MCNC: 314 MG/DL (ref 65–140)
HCT VFR BLD AUTO: 41.6 % (ref 36.5–49.3)
HGB BLD-MCNC: 13.8 G/DL (ref 12–17)
IMM GRANULOCYTES # BLD AUTO: 0.12 THOUSAND/UL (ref 0–0.2)
IMM GRANULOCYTES NFR BLD AUTO: 2 % (ref 0–2)
LYMPHOCYTES # BLD AUTO: 1.37 THOUSANDS/ΜL (ref 0.6–4.47)
LYMPHOCYTES NFR BLD AUTO: 20 % (ref 14–44)
MCH RBC QN AUTO: 29.7 PG (ref 26.8–34.3)
MCHC RBC AUTO-ENTMCNC: 33.2 G/DL (ref 31.4–37.4)
MCV RBC AUTO: 90 FL (ref 82–98)
MONOCYTES # BLD AUTO: 0.61 THOUSAND/ΜL (ref 0.17–1.22)
MONOCYTES NFR BLD AUTO: 9 % (ref 4–12)
NEUTROPHILS # BLD AUTO: 4.78 THOUSANDS/ΜL (ref 1.85–7.62)
NEUTS SEG NFR BLD AUTO: 69 % (ref 43–75)
NRBC BLD AUTO-RTO: 0 /100 WBCS
PLATELET # BLD AUTO: 568 THOUSANDS/UL (ref 149–390)
PMV BLD AUTO: 8.7 FL (ref 8.9–12.7)
POTASSIUM SERPL-SCNC: 4.6 MMOL/L (ref 3.5–5.3)
PROT SERPL-MCNC: 7.5 G/DL (ref 6.4–8.2)
RBC # BLD AUTO: 4.65 MILLION/UL (ref 3.88–5.62)
SODIUM SERPL-SCNC: 134 MMOL/L (ref 136–145)
WBC # BLD AUTO: 6.9 THOUSAND/UL (ref 4.31–10.16)

## 2021-01-08 PROCEDURE — 85025 COMPLETE CBC W/AUTO DIFF WBC: CPT | Performed by: INTERNAL MEDICINE

## 2021-01-08 PROCEDURE — 99232 SBSQ HOSP IP/OBS MODERATE 35: CPT | Performed by: INTERNAL MEDICINE

## 2021-01-08 PROCEDURE — 82533 TOTAL CORTISOL: CPT | Performed by: INTERNAL MEDICINE

## 2021-01-08 PROCEDURE — 82948 REAGENT STRIP/BLOOD GLUCOSE: CPT

## 2021-01-08 PROCEDURE — 80053 COMPREHEN METABOLIC PANEL: CPT | Performed by: INTERNAL MEDICINE

## 2021-01-08 RX ADMIN — MELATONIN 3 MG: at 21:42

## 2021-01-08 RX ADMIN — OXYCODONE HYDROCHLORIDE AND ACETAMINOPHEN 1000 MG: 500 TABLET ORAL at 20:37

## 2021-01-08 RX ADMIN — DEXAMETHASONE SODIUM PHOSPHATE 6 MG: 4 INJECTION INTRA-ARTICULAR; INTRALESIONAL; INTRAMUSCULAR; INTRAVENOUS; SOFT TISSUE at 20:38

## 2021-01-08 RX ADMIN — OXYCODONE HYDROCHLORIDE AND ACETAMINOPHEN 1000 MG: 500 TABLET ORAL at 09:04

## 2021-01-08 RX ADMIN — ENOXAPARIN SODIUM 80 MG: 80 INJECTION SUBCUTANEOUS at 09:05

## 2021-01-08 RX ADMIN — FAMOTIDINE 20 MG: 20 TABLET ORAL at 17:24

## 2021-01-08 RX ADMIN — CEFTRIAXONE SODIUM 1000 MG: 10 INJECTION, POWDER, FOR SOLUTION INTRAVENOUS at 20:52

## 2021-01-08 RX ADMIN — BENZONATATE 100 MG: 100 CAPSULE ORAL at 09:06

## 2021-01-08 RX ADMIN — INSULIN LISPRO 2 UNITS: 100 INJECTION, SOLUTION INTRAVENOUS; SUBCUTANEOUS at 17:23

## 2021-01-08 RX ADMIN — ZINC SULFATE 220 MG (50 MG) CAPSULE 220 MG: CAPSULE at 09:04

## 2021-01-08 RX ADMIN — DOXYCYCLINE 100 MG: 100 CAPSULE ORAL at 09:04

## 2021-01-08 RX ADMIN — ENOXAPARIN SODIUM 80 MG: 80 INJECTION SUBCUTANEOUS at 20:38

## 2021-01-08 RX ADMIN — INSULIN LISPRO 2 UNITS: 100 INJECTION, SOLUTION INTRAVENOUS; SUBCUTANEOUS at 21:45

## 2021-01-08 RX ADMIN — INSULIN LISPRO 2 UNITS: 100 INJECTION, SOLUTION INTRAVENOUS; SUBCUTANEOUS at 08:45

## 2021-01-08 RX ADMIN — DOXYCYCLINE 100 MG: 100 CAPSULE ORAL at 20:38

## 2021-01-08 RX ADMIN — BENZONATATE 100 MG: 100 CAPSULE ORAL at 01:04

## 2021-01-08 RX ADMIN — FAMOTIDINE 20 MG: 20 TABLET ORAL at 09:05

## 2021-01-08 RX ADMIN — INSULIN LISPRO 3 UNITS: 100 INJECTION, SOLUTION INTRAVENOUS; SUBCUTANEOUS at 11:58

## 2021-01-08 RX ADMIN — Medication 2000 UNITS: at 09:05

## 2021-01-08 NOTE — PLAN OF CARE
Problem: Nutrition/Hydration-ADULT  Goal: Nutrient/Hydration intake appropriate for improving, restoring or maintaining nutritional needs  Description: Monitor and assess patient's nutrition/hydration status for malnutrition  Collaborate with interdisciplinary team and initiate plan and interventions as ordered  Monitor patient's weight and dietary intake as ordered or per policy  Utilize nutrition screening tool and intervene as necessary  Determine patient's food preferences and provide high-protein, high-caloric foods as appropriate       INTERVENTIONS:  - Monitor oral intake, urinary output, labs, and treatment plans  - Assess nutrition and hydration status and recommend course of action  - Evaluate amount of meals eaten  - Assist patient with eating if necessary   - Allow adequate time for meals  - Recommend/ encourage appropriate diets, oral nutritional supplements, and vitamin/mineral supplements  - Order, calculate, and assess calorie counts as needed  - Assess need for intravenous fluids  - Provide nutrition/hydration education as appropriate  - Include patient/family/caregiver in decisions related to nutrition  1/8/2021 0223 by Ben Johnson RN  Outcome: Progressing  1/8/2021 0223 by Ben Johnson RN  Outcome: Progressing     Problem: PAIN - ADULT  Goal: Verbalizes/displays adequate comfort level or baseline comfort level  Description: Interventions:  - Encourage patient to monitor pain and request assistance  - Assess pain using appropriate pain scale  - Administer analgesics based on type and severity of pain and evaluate response  - Implement non-pharmacological measures as appropriate and evaluate response  - Consider cultural and social influences on pain and pain management  - Notify physician/advanced practitioner if interventions unsuccessful or patient reports new pain  Outcome: Progressing     Problem: INFECTION - ADULT  Goal: Absence or prevention of progression during hospitalization  Description: INTERVENTIONS:  - Assess and monitor for signs and symptoms of infection  - Monitor lab/diagnostic results  - Monitor all insertion sites, i e  indwelling lines, tubes, and drains  - Monitor endotracheal if appropriate and nasal secretions for changes in amount and color  - [place] appropriate cooling/warming therapies per order  - Administer medications as ordered  - Instruct and encourage patient and family to use good hand hygiene technique  - Identify and instruct in appropriate isolation precautions for identified infection/condition  Outcome: Progressing     Problem: DISCHARGE PLANNING  Goal: Discharge to home or other facility with appropriate resources  Description: INTERVENTIONS:  - Identify barriers to discharge w/patient and caregiver  - Arrange for needed discharge resources and transportation as appropriate  - Identify discharge learning needs (meds, wound care, etc )  - Arrange for interpretive services to assist at discharge as needed  - Refer to Case Management Department for coordinating discharge planning if the patient needs post-hospital services based on physician/advanced practitioner order or complex needs related to functional status, cognitive ability, or social support system  Outcome: Progressing     Problem: Knowledge Deficit  Goal: Patient/family/caregiver demonstrates understanding of disease process, treatment plan, medications, and discharge instructions  Description: Complete learning assessment and assess knowledge base    Interventions:  - Provide teaching at level of understanding  - Provide teaching via preferred learning methods  Outcome: Progressing     Problem: RESPIRATORY - ADULT  Goal: Achieves optimal ventilation and oxygenation  Description: INTERVENTIONS:  - Assess for changes in respiratory status  - Assess for changes in mentation and behavior  - Position to facilitate oxygenation and minimize respiratory effort  - Oxygen administered by appropriate delivery if ordered  - Initiate smoking cessation education as indicated  - Encourage broncho-pulmonary hygiene including cough, deep breathe, Incentive Spirometry  - Assess the need for suctioning and aspirate as needed  - Assess and instruct to report SOB or any respiratory difficulty  - Respiratory Therapy support as indicated  Outcome: Progressing     Problem: METABOLIC, FLUID AND ELECTROLYTES - ADULT  Goal: Glucose maintained within target range  Description: INTERVENTIONS:  - Monitor Blood Glucose as ordered  - Assess for signs and symptoms of hyperglycemia and hypoglycemia  - Administer ordered medications to maintain glucose within target range  - Assess nutritional intake and initiate nutrition service referral as needed  Outcome: Progressing

## 2021-01-08 NOTE — ASSESSMENT & PLAN NOTE
Lab Results   Component Value Date    HGBA1C 10 5 (H) 01/05/2021       Recent Labs     01/07/21  1101 01/07/21  1610 01/07/21  2112 01/08/21  0644   POCGLU 197* 186* 292* 194*       Blood Sugar Average: Last 72 hrs:  · (P) 197 Check A1c  · Blood glucose checks q i d , hypoglycemia protocol  · Will hold home metformin  · Sliding scale insulin

## 2021-01-08 NOTE — ASSESSMENT & PLAN NOTE
· Currently meeting criteria due to tachycardia, tachypnea, febrile in setting of COVID-19 pneumonia  · Lactic acid WNL  · Blood cultures negative  · Continue antibiotics  · Continue IV fluid hydration    See plan under COVID [facility]

## 2021-01-08 NOTE — ASSESSMENT & PLAN NOTE
· Reports being diagnosed on 12/23 with COVID-19  · Since this past Sunday has reported feeling increased generalized weakness, fatigue, shortness of breath with productive cough  · Will follow mild COVID protocol as patient requiring 2 L nasal cannula which will continue  Chest x-ray revealing COVID pneumonia  · Due to being diagnosed 2 weeks ago with COVID-19, will hold off on IV remdesivir  · CT A negative for PE, revealing ground-glass COVID pneumonia appearance  · D-dimer elevated at 1 44, negative for PE, continue to trend    Patient continues to improve slowly  Plan:  Continue vitamin-C, D, zinc  Continue IV Decadron 6 mg q d  Started in ED  Continue IV ceftriaxone 1 g q d  And doxycycline p o  100 mg q 12 hours   Attempt to wean oxygen    Continue to Monitor clinically, ventilatory status

## 2021-01-08 NOTE — ASSESSMENT & PLAN NOTE
· Incidental right adrenal nodule noted on CT a  · Per radiology follow-up in 1 year, please let patient know on discharge    Potassium now better after kayexelate

## 2021-01-09 LAB
ANION GAP SERPL CALCULATED.3IONS-SCNC: 9 MMOL/L (ref 4–13)
BASOPHILS # BLD AUTO: 0.01 THOUSANDS/ΜL (ref 0–0.1)
BASOPHILS NFR BLD AUTO: 0 % (ref 0–1)
BUN SERPL-MCNC: 26 MG/DL (ref 5–25)
CALCIUM SERPL-MCNC: 9.1 MG/DL (ref 8.3–10.1)
CHLORIDE SERPL-SCNC: 99 MMOL/L (ref 100–108)
CO2 SERPL-SCNC: 24 MMOL/L (ref 21–32)
CREAT SERPL-MCNC: 1.04 MG/DL (ref 0.6–1.3)
EOSINOPHIL # BLD AUTO: 0.01 THOUSAND/ΜL (ref 0–0.61)
EOSINOPHIL NFR BLD AUTO: 0 % (ref 0–6)
ERYTHROCYTE [DISTWIDTH] IN BLOOD BY AUTOMATED COUNT: 11.9 % (ref 11.6–15.1)
GFR SERPL CREATININE-BSD FRML MDRD: 98 ML/MIN/1.73SQ M
GLUCOSE SERPL-MCNC: 260 MG/DL (ref 65–140)
GLUCOSE SERPL-MCNC: 260 MG/DL (ref 65–140)
GLUCOSE SERPL-MCNC: 301 MG/DL (ref 65–140)
GLUCOSE SERPL-MCNC: 327 MG/DL (ref 65–140)
GLUCOSE SERPL-MCNC: 363 MG/DL (ref 65–140)
HCT VFR BLD AUTO: 40.7 % (ref 36.5–49.3)
HGB BLD-MCNC: 13.5 G/DL (ref 12–17)
IMM GRANULOCYTES # BLD AUTO: 0.14 THOUSAND/UL (ref 0–0.2)
IMM GRANULOCYTES NFR BLD AUTO: 2 % (ref 0–2)
LYMPHOCYTES # BLD AUTO: 1.47 THOUSANDS/ΜL (ref 0.6–4.47)
LYMPHOCYTES NFR BLD AUTO: 22 % (ref 14–44)
MCH RBC QN AUTO: 29.7 PG (ref 26.8–34.3)
MCHC RBC AUTO-ENTMCNC: 33.2 G/DL (ref 31.4–37.4)
MCV RBC AUTO: 90 FL (ref 82–98)
MONOCYTES # BLD AUTO: 0.52 THOUSAND/ΜL (ref 0.17–1.22)
MONOCYTES NFR BLD AUTO: 8 % (ref 4–12)
NEUTROPHILS # BLD AUTO: 4.57 THOUSANDS/ΜL (ref 1.85–7.62)
NEUTS SEG NFR BLD AUTO: 68 % (ref 43–75)
NRBC BLD AUTO-RTO: 0 /100 WBCS
PLATELET # BLD AUTO: 590 THOUSANDS/UL (ref 149–390)
PMV BLD AUTO: 8.6 FL (ref 8.9–12.7)
POTASSIUM SERPL-SCNC: 5.2 MMOL/L (ref 3.5–5.3)
RBC # BLD AUTO: 4.55 MILLION/UL (ref 3.88–5.62)
SODIUM SERPL-SCNC: 132 MMOL/L (ref 136–145)
WBC # BLD AUTO: 6.72 THOUSAND/UL (ref 4.31–10.16)

## 2021-01-09 PROCEDURE — 80048 BASIC METABOLIC PNL TOTAL CA: CPT | Performed by: INTERNAL MEDICINE

## 2021-01-09 PROCEDURE — 99232 SBSQ HOSP IP/OBS MODERATE 35: CPT | Performed by: INTERNAL MEDICINE

## 2021-01-09 PROCEDURE — 82948 REAGENT STRIP/BLOOD GLUCOSE: CPT

## 2021-01-09 PROCEDURE — 85025 COMPLETE CBC W/AUTO DIFF WBC: CPT | Performed by: INTERNAL MEDICINE

## 2021-01-09 RX ADMIN — DEXAMETHASONE SODIUM PHOSPHATE 6 MG: 4 INJECTION INTRA-ARTICULAR; INTRALESIONAL; INTRAMUSCULAR; INTRAVENOUS; SOFT TISSUE at 22:46

## 2021-01-09 RX ADMIN — ENOXAPARIN SODIUM 80 MG: 80 INJECTION SUBCUTANEOUS at 10:01

## 2021-01-09 RX ADMIN — DOXYCYCLINE 100 MG: 100 CAPSULE ORAL at 10:00

## 2021-01-09 RX ADMIN — ZINC SULFATE 220 MG (50 MG) CAPSULE 220 MG: CAPSULE at 10:00

## 2021-01-09 RX ADMIN — INSULIN LISPRO 2 UNITS: 100 INJECTION, SOLUTION INTRAVENOUS; SUBCUTANEOUS at 10:05

## 2021-01-09 RX ADMIN — INSULIN LISPRO 3 UNITS: 100 INJECTION, SOLUTION INTRAVENOUS; SUBCUTANEOUS at 22:48

## 2021-01-09 RX ADMIN — DOXYCYCLINE 100 MG: 100 CAPSULE ORAL at 22:46

## 2021-01-09 RX ADMIN — Medication 2000 UNITS: at 10:00

## 2021-01-09 RX ADMIN — FAMOTIDINE 20 MG: 20 TABLET ORAL at 10:00

## 2021-01-09 RX ADMIN — OXYCODONE HYDROCHLORIDE AND ACETAMINOPHEN 1000 MG: 500 TABLET ORAL at 10:00

## 2021-01-09 RX ADMIN — INSULIN LISPRO 4 UNITS: 100 INJECTION, SOLUTION INTRAVENOUS; SUBCUTANEOUS at 17:46

## 2021-01-09 RX ADMIN — OXYCODONE HYDROCHLORIDE AND ACETAMINOPHEN 1000 MG: 500 TABLET ORAL at 22:47

## 2021-01-09 RX ADMIN — INSULIN LISPRO 3 UNITS: 100 INJECTION, SOLUTION INTRAVENOUS; SUBCUTANEOUS at 12:51

## 2021-01-09 RX ADMIN — ENOXAPARIN SODIUM 80 MG: 80 INJECTION SUBCUTANEOUS at 22:47

## 2021-01-09 RX ADMIN — FAMOTIDINE 20 MG: 20 TABLET ORAL at 17:45

## 2021-01-09 RX ADMIN — MELATONIN 3 MG: at 22:49

## 2021-01-09 RX ADMIN — CEFTRIAXONE SODIUM 1000 MG: 10 INJECTION, POWDER, FOR SOLUTION INTRAVENOUS at 22:47

## 2021-01-09 RX ADMIN — INSULIN DETEMIR 10 UNITS: 100 INJECTION, SOLUTION SUBCUTANEOUS at 22:48

## 2021-01-09 NOTE — ASSESSMENT & PLAN NOTE
· Currently meeting criteria due to tachycardia, tachypnea, febrile in setting of COVID-19 pneumonia  · Lactic acid WNL  · Blood cultures negative  · Continue antibiotics      See plan under COVID [facility]

## 2021-01-09 NOTE — PROGRESS NOTES
Progress Note - Reg Mayorga 1975, 48 y o  male MRN: [id number]    Unit/Bed#: -01 Encounter: [id number]    Primary Care Provider: No primary care provider on file  Date and time admitted to hospital: 1/4/2021  8:02 PM        * Pneumonia due to COVID-19 virus  Assessment & Plan  · Reports being diagnosed on 12/23 with COVID-19  · Since this past Sunday has reported feeling increased generalized weakness, fatigue, shortness of breath with productive cough  · Will follow mild COVID protocol as patient requiring 2 L nasal cannula which will continue  Chest x-ray revealing COVID pneumonia  · Due to being diagnosed 2 weeks ago with COVID-19, will hold off on IV remdesivir  · CT A negative for PE, revealing ground-glass COVID pneumonia appearance  · D-dimer elevated at 1 44, negative for PE, continue to trend    Patient continues to improve in a daily basis however is still oxygen dependent at this point  Plan:  Continue vitamin-C, D, zinc  Continue IV Decadron 6 mg q d  Started in ED  Continue IV ceftriaxone 1 g q d  And doxycycline p o  100 mg q 12 hours   Attempt to wean oxygen  Continue to Monitor clinically, ventilatory status      Enlarged thyroid gland  Assessment & Plan  · Incidental on CT outpatient follow-up  Adrenal nodule (HCC)  Assessment & Plan  · Incidental right adrenal nodule noted on CT a  · Per radiology follow-up in 1 year, please let patient know on discharge  · Cortisol level actually noted to be low  · Can consider cosyntropin stimulation test after resolution of acute illness  Potassium now better after kayexelate      Sepsis ([facility] [address] )  Assessment & Plan  · Currently meeting criteria due to tachycardia, tachypnea, febrile in setting of COVID-19 pneumonia  · Lactic acid WNL  · Blood cultures negative  · Continue antibiotics      See plan under COVID [facility]    Transaminitis  Assessment & Plan  · Most likely in setting COVID-19 pneumonia  · Monitor CMP    Diabetes mellitus type 1 [facility])  Assessment & Plan  Lab Results   Component Value Date    HGBA1C 10 5 (H) 2021       Recent Labs     21  1605 21  2101 21  0524 21  1133   POCGLU 246* 202* 260* 301*       Blood Sugar Average: Last 72 hrs:  · (P) 222 2 Check A1c  · Blood glucose checks q i d , hypoglycemia protocol  · Will hold home metformin  · Sliding scale insulin  Given persistently elevated blood sugars will start patient on standing dose of Levemir at night    JUAN JOSE (acute kidney injury) ([facility] [address] )  Assessment & Plan  · Creatinine peaked 1 41  · Denies history of CKD, no baseline per review of chart  · Now 1 0 which is adequate for him  · Monitor  VTE Pharmacologic Prophylaxis:   Pharmacologic: Enoxaparin (Lovenox)  Mechanical VTE Prophylaxis in Place: Yes    Patient Centered Rounds: I have performed bedside rounds with nursing staff today  Time Spent for Care: 30 minutes  More than 50% of total time spent on counseling and coordination of care as described above  Current Length of Stay: 5 day(s)    Current Patient Status: Inpatient   Certification Statement: The patient will continue to require additional inpatient hospital stay due to Need for COVID-19 treatment    Discharge Plan:  Once stable    Code Status: Level 1 - Full Code      Subjective:     Patient evaluated this morning  He is feeling slightly better compared to day before  Currently still being 2 L of oxygen  He denies nausea vomiting, diarrhea constipation  No other events reported    Objective:     Vitals:   Temp (24hrs), Av 1 °F (36 7 °C), Min:98 1 °F (36 7 °C), Max:98 1 °F (36 7 °C)    Temp:  [98 1 °F (36 7 °C)] 98 1 °F (36 7 °C)  HR:  [58-85] 58  Resp:  [19] 19  BP: (112-116)/(72-78) 116/78  SpO2:  [91 %-95 %] 95 %  Body mass index is 25 2 kg/m²  Input and Output Summary (last 24 hours):        Intake/Output Summary (Last 24 hours) at 2021 1445  Last data filed at 2021 203  Gross per 24 hour   Intake 620 ml Output    Net 620 ml       Physical Exam:     Physical Exam  Vitals signs and nursing note reviewed  Constitutional:       Appearance: Normal appearance  Comments: Middle-age male in bed, awake   HENT:      Head: Normocephalic and atraumatic  Right Ear: External ear normal       Left Ear: External ear normal       Nose: Nose normal  No congestion or rhinorrhea  Mouth/Throat:      Mouth: Mucous membranes are moist       Pharynx: Oropharynx is clear  No oropharyngeal exudate or posterior oropharyngeal erythema  Eyes:      General: No scleral icterus  Right eye: No discharge  Left eye: No discharge  Pupils: Pupils are equal, round, and reactive to light  Neck:      Musculoskeletal: Normal range of motion  No neck rigidity or muscular tenderness  Vascular: No carotid bruit  Cardiovascular:      Rate and Rhythm: Normal rate and regular rhythm  Pulses: Normal pulses  Heart sounds: No murmur  No friction rub  No gallop  Pulmonary:      Effort: Pulmonary effort is normal  No respiratory distress  Breath sounds: Normal breath sounds  No stridor  No wheezing, rhonchi or rales  Abdominal:      General: Abdomen is flat  Bowel sounds are normal  There is no distension  Palpations: Abdomen is soft  There is no mass  Tenderness: There is no abdominal tenderness  There is no guarding or rebound  Hernia: No hernia is present  Musculoskeletal: Normal range of motion  General: No swelling, tenderness, deformity or signs of injury  Lymphadenopathy:      Cervical: No cervical adenopathy  Skin:     General: Skin is warm and dry  Capillary Refill: Capillary refill takes less than 2 seconds  Coloration: Skin is not jaundiced or pale  Findings: No bruising or erythema  Neurological:      General: No focal deficit present  Mental Status: He is alert and oriented to person, place, and time  Mental status is at baseline  Cranial Nerves: No cranial nerve deficit  Sensory: No sensory deficit  Motor: No weakness  Coordination: Coordination normal       Deep Tendon Reflexes: Reflexes normal    Psychiatric:         Mood and Affect: Mood normal          Behavior: Behavior normal          Thought Content: Thought content normal          Judgment: Judgment normal            Additional Data:     Labs:    Results from last 7 days   Lab Units 01/09/21 0526 01/04/21 2052   WBC Thousand/uL 6 72   < > 7 38   HEMOGLOBIN g/dL 13 5   < > 13 5   HEMATOCRIT % 40 7   < > 40 9   PLATELETS Thousands/uL 590*   < > 425*   BANDS PCT %  --   --  1   NEUTROS PCT % 68   < >  --    LYMPHS PCT % 22   < >  --    LYMPHO PCT %  --   --  8*   MONOS PCT % 8   < >  --    MONO PCT %  --   --  11   EOS PCT % 0   < > 0    < > = values in this interval not displayed  Results from last 7 days   Lab Units 01/09/21  0526 01/08/21  0925   SODIUM mmol/L 132* 134*   POTASSIUM mmol/L 5 2 4 6   CHLORIDE mmol/L 99* 99*   CO2 mmol/L 24 26   BUN mg/dL 26* 22   CREATININE mg/dL 1 04 1 17   ANION GAP mmol/L 9 9   CALCIUM mg/dL 9 1 8 9   ALBUMIN g/dL  --  2 3*   TOTAL BILIRUBIN mg/dL  --  0 40   ALK PHOS U/L  --  103   ALT U/L  --  53   AST U/L  --  21   GLUCOSE RANDOM mg/dL 260* 314*     Results from last 7 days   Lab Units 01/04/21 2052   INR  1 02     Results from last 7 days   Lab Units 01/09/21  1133 01/09/21  0524 01/08/21  2101 01/08/21  1605 01/08/21  1119 01/08/21  0930 01/08/21  0644 01/07/21  2112 01/07/21  1610 01/07/21  1101 01/07/21  0542 01/06/21 2052   POC GLUCOSE mg/dl 301* 260* 202* 246* 239* 263* 194* 292* 186* 197* 236* 204*     Results from last 7 days   Lab Units 01/05/21  0617   HEMOGLOBIN A1C % 10 5*     Results from last 7 days   Lab Units 01/05/21  0622 01/04/21 2052   LACTIC ACID mmol/L  --  1 7   PROCALCITONIN ng/ml 0 17 0 21           * I Have Reviewed All Lab Data Listed Above  * Additional Pertinent Lab Tests Reviewed:  All Labs For Current Hospital Admission Reviewed      Recent Cultures (last 7 days):     Results from last 7 days   Lab Units 01/04/21 2052   BLOOD CULTURE  No Growth After 4 Days  No Growth After 4 Days  Last 24 Hours Medication List:   Current Facility-Administered Medications   Medication Dose Route Frequency Provider Last Rate    ascorbic acid  1,000 mg Oral Q12H [address] [place]ye Blakely PA-C      benzonatate  100 mg Oral TID PRN Renetta Loredo PA-C      cefTRIAXone  1,000 mg Intravenous Q24H PILI RedC 1,000 mg (01/08/21 2052)    cholecalciferol  2,000 Units Oral Daily Biju Blakely PA-C      dexamethasone  6 mg Intravenous Q24H Biju Blakely PA-C      doxycycline hyclate  100 mg Oral Q12H Biju Blakely PA-C      enoxaparin  1 mg/kg Subcutaneous Q12H 5001 RODRIGUEZ Lee MD      famotidine  20 mg Oral BID Biju Blakely PA-C      insulin detemir  10 Units Subcutaneous HS Guero Kong MD      insulin lispro  1-5 Units Subcutaneous TID AC Biju Blakely PA-C      insulin lispro  1-5 Units Subcutaneous HS Biju Blakely PA-C      melatonin  3 mg Oral HS NEUSIRUSTY WHITEHEAD      zinc sulfate  220 mg Oral Daily Biju Blakely PA-C      Followed by   Saad Chinchilla ON 1/12/2021] multivitamin-minerals  1 tablet Oral Daily Biju Blakely PA-C          Today, Patient Was Seen By: Guero Kong MD    ** Please Note: Dictation voice to text software may have been used in the creation of this document   **

## 2021-01-09 NOTE — ASSESSMENT & PLAN NOTE
· Reports being diagnosed on 12/23 with COVID-19  · Since this past Sunday has reported feeling increased generalized weakness, fatigue, shortness of breath with productive cough  · Will follow mild COVID protocol as patient requiring 2 L nasal cannula which will continue  Chest x-ray revealing COVID pneumonia  · Due to being diagnosed 2 weeks ago with COVID-19, will hold off on IV remdesivir  · CT A negative for PE, revealing ground-glass COVID pneumonia appearance  · D-dimer elevated at 1 44, negative for PE, continue to trend    Patient continues to improve in a daily basis however is still oxygen dependent at this point  Plan:  Continue vitamin-C, D, zinc  Continue IV Decadron 6 mg q d  Started in ED  Continue IV ceftriaxone 1 g q d  And doxycycline p o  100 mg q 12 hours   Attempt to wean oxygen    Continue to Monitor clinically, ventilatory status

## 2021-01-09 NOTE — ASSESSMENT & PLAN NOTE
· Incidental right adrenal nodule noted on CT a  · Per radiology follow-up in 1 year, please let patient know on discharge  · Cortisol level actually noted to be low  · Can consider cosyntropin stimulation test after resolution of acute illness  Potassium now better after kayexelate

## 2021-01-09 NOTE — ASSESSMENT & PLAN NOTE
Lab Results   Component Value Date    HGBA1C 10 5 (H) 01/05/2021       Recent Labs     01/08/21  1605 01/08/21  2101 01/09/21  0524 01/09/21  1133   POCGLU 246* 202* 260* 301*       Blood Sugar Average: Last 72 hrs:  · (P) 222 2 Check A1c  · Blood glucose checks q i d , hypoglycemia protocol  · Will hold home metformin  · Sliding scale insulin  Given persistently elevated blood sugars will start patient on standing dose of Levemir at night

## 2021-01-10 PROBLEM — E87.5 HYPERKALEMIA: Status: ACTIVE | Noted: 2021-01-10

## 2021-01-10 LAB
ANION GAP SERPL CALCULATED.3IONS-SCNC: 4 MMOL/L (ref 4–13)
BACTERIA BLD CULT: NORMAL
BACTERIA BLD CULT: NORMAL
BASOPHILS # BLD AUTO: 0.01 THOUSANDS/ΜL (ref 0–0.1)
BASOPHILS NFR BLD AUTO: 0 % (ref 0–1)
BUN SERPL-MCNC: 29 MG/DL (ref 5–25)
CALCIUM SERPL-MCNC: 9.5 MG/DL (ref 8.3–10.1)
CHLORIDE SERPL-SCNC: 101 MMOL/L (ref 100–108)
CO2 SERPL-SCNC: 28 MMOL/L (ref 21–32)
CREAT SERPL-MCNC: 1.14 MG/DL (ref 0.6–1.3)
EOSINOPHIL # BLD AUTO: 0.02 THOUSAND/ΜL (ref 0–0.61)
EOSINOPHIL NFR BLD AUTO: 0 % (ref 0–6)
ERYTHROCYTE [DISTWIDTH] IN BLOOD BY AUTOMATED COUNT: 11.8 % (ref 11.6–15.1)
GFR SERPL CREATININE-BSD FRML MDRD: 87 ML/MIN/1.73SQ M
GLUCOSE SERPL-MCNC: 319 MG/DL (ref 65–140)
GLUCOSE SERPL-MCNC: 337 MG/DL (ref 65–140)
GLUCOSE SERPL-MCNC: 345 MG/DL (ref 65–140)
GLUCOSE SERPL-MCNC: 364 MG/DL (ref 65–140)
GLUCOSE SERPL-MCNC: 399 MG/DL (ref 65–140)
HCT VFR BLD AUTO: 40.9 % (ref 36.5–49.3)
HGB BLD-MCNC: 13.4 G/DL (ref 12–17)
IMM GRANULOCYTES # BLD AUTO: 0.1 THOUSAND/UL (ref 0–0.2)
IMM GRANULOCYTES NFR BLD AUTO: 2 % (ref 0–2)
LYMPHOCYTES # BLD AUTO: 1.32 THOUSANDS/ΜL (ref 0.6–4.47)
LYMPHOCYTES NFR BLD AUTO: 20 % (ref 14–44)
MCH RBC QN AUTO: 29.6 PG (ref 26.8–34.3)
MCHC RBC AUTO-ENTMCNC: 32.8 G/DL (ref 31.4–37.4)
MCV RBC AUTO: 91 FL (ref 82–98)
MONOCYTES # BLD AUTO: 0.48 THOUSAND/ΜL (ref 0.17–1.22)
MONOCYTES NFR BLD AUTO: 7 % (ref 4–12)
NEUTROPHILS # BLD AUTO: 4.55 THOUSANDS/ΜL (ref 1.85–7.62)
NEUTS SEG NFR BLD AUTO: 71 % (ref 43–75)
NRBC BLD AUTO-RTO: 0 /100 WBCS
PLATELET # BLD AUTO: 582 THOUSANDS/UL (ref 149–390)
PMV BLD AUTO: 8.6 FL (ref 8.9–12.7)
POTASSIUM SERPL-SCNC: 4.1 MMOL/L (ref 3.5–5.3)
POTASSIUM SERPL-SCNC: 6.5 MMOL/L (ref 3.5–5.3)
RBC # BLD AUTO: 4.52 MILLION/UL (ref 3.88–5.62)
SODIUM SERPL-SCNC: 133 MMOL/L (ref 136–145)
WBC # BLD AUTO: 6.48 THOUSAND/UL (ref 4.31–10.16)

## 2021-01-10 PROCEDURE — 84132 ASSAY OF SERUM POTASSIUM: CPT | Performed by: INTERNAL MEDICINE

## 2021-01-10 PROCEDURE — 94640 AIRWAY INHALATION TREATMENT: CPT

## 2021-01-10 PROCEDURE — 85025 COMPLETE CBC W/AUTO DIFF WBC: CPT | Performed by: INTERNAL MEDICINE

## 2021-01-10 PROCEDURE — 82948 REAGENT STRIP/BLOOD GLUCOSE: CPT

## 2021-01-10 PROCEDURE — 99255 IP/OBS CONSLTJ NEW/EST HI 80: CPT | Performed by: INTERNAL MEDICINE

## 2021-01-10 PROCEDURE — 99232 SBSQ HOSP IP/OBS MODERATE 35: CPT | Performed by: INTERNAL MEDICINE

## 2021-01-10 PROCEDURE — 94760 N-INVAS EAR/PLS OXIMETRY 1: CPT

## 2021-01-10 PROCEDURE — 84133 ASSAY OF URINE POTASSIUM: CPT | Performed by: INTERNAL MEDICINE

## 2021-01-10 PROCEDURE — 80048 BASIC METABOLIC PNL TOTAL CA: CPT | Performed by: INTERNAL MEDICINE

## 2021-01-10 PROCEDURE — 93005 ELECTROCARDIOGRAM TRACING: CPT

## 2021-01-10 RX ORDER — LIDOCAINE HYDROCHLORIDE 20 MG/ML
15 SOLUTION OROPHARYNGEAL 4 TIMES DAILY PRN
Status: DISCONTINUED | OUTPATIENT
Start: 2021-01-10 | End: 2021-01-12 | Stop reason: HOSPADM

## 2021-01-10 RX ORDER — GUAIFENESIN/DEXTROMETHORPHAN 100-10MG/5
10 SYRUP ORAL EVERY 4 HOURS PRN
Status: DISCONTINUED | OUTPATIENT
Start: 2021-01-10 | End: 2021-01-12 | Stop reason: HOSPADM

## 2021-01-10 RX ORDER — SODIUM POLYSTYRENE SULFONATE 4.1 MEQ/G
30 POWDER, FOR SUSPENSION ORAL; RECTAL ONCE
Status: COMPLETED | OUTPATIENT
Start: 2021-01-10 | End: 2021-01-10

## 2021-01-10 RX ORDER — FLUDROCORTISONE ACETATE 0.1 MG/1
0.05 TABLET ORAL DAILY
Status: DISCONTINUED | OUTPATIENT
Start: 2021-01-10 | End: 2021-01-12 | Stop reason: HOSPADM

## 2021-01-10 RX ORDER — ALBUTEROL SULFATE 2.5 MG/3ML
10 SOLUTION RESPIRATORY (INHALATION) ONCE
Status: DISCONTINUED | OUTPATIENT
Start: 2021-01-10 | End: 2021-01-10

## 2021-01-10 RX ORDER — DEXTROSE MONOHYDRATE 25 G/50ML
25 INJECTION, SOLUTION INTRAVENOUS ONCE
Status: COMPLETED | OUTPATIENT
Start: 2021-01-10 | End: 2021-01-10

## 2021-01-10 RX ORDER — CALCIUM GLUCONATE 20 MG/ML
1 INJECTION, SOLUTION INTRAVENOUS ONCE
Status: COMPLETED | OUTPATIENT
Start: 2021-01-10 | End: 2021-01-10

## 2021-01-10 RX ORDER — BENZONATATE 100 MG/1
100 CAPSULE ORAL 3 TIMES DAILY
Status: DISCONTINUED | OUTPATIENT
Start: 2021-01-10 | End: 2021-01-12 | Stop reason: HOSPADM

## 2021-01-10 RX ADMIN — MELATONIN 3 MG: at 21:44

## 2021-01-10 RX ADMIN — SODIUM BICARBONATE 125 ML/HR: 84 INJECTION, SOLUTION INTRAVENOUS at 22:17

## 2021-01-10 RX ADMIN — ALBUTEROL SULFATE 5 MG: 2.5 SOLUTION RESPIRATORY (INHALATION) at 09:17

## 2021-01-10 RX ADMIN — SODIUM POLYSTYRENE SULFONATE 30 G: 1 POWDER ORAL; RECTAL at 08:09

## 2021-01-10 RX ADMIN — HYDROCORTISONE SODIUM SUCCINATE 25 MG: 100 INJECTION, POWDER, FOR SOLUTION INTRAMUSCULAR; INTRAVENOUS at 21:46

## 2021-01-10 RX ADMIN — DEXTROSE MONOHYDRATE 25 ML: 500 INJECTION PARENTERAL at 08:13

## 2021-01-10 RX ADMIN — CALCIUM GLUCONATE 1 G: 20 INJECTION, SOLUTION INTRAVENOUS at 11:04

## 2021-01-10 RX ADMIN — OXYCODONE HYDROCHLORIDE AND ACETAMINOPHEN 1000 MG: 500 TABLET ORAL at 07:56

## 2021-01-10 RX ADMIN — BENZONATATE 100 MG: 100 CAPSULE ORAL at 17:17

## 2021-01-10 RX ADMIN — BENZONATATE 100 MG: 100 CAPSULE ORAL at 21:44

## 2021-01-10 RX ADMIN — HYDROCORTISONE SODIUM SUCCINATE 25 MG: 100 INJECTION, POWDER, FOR SOLUTION INTRAMUSCULAR; INTRAVENOUS at 14:35

## 2021-01-10 RX ADMIN — FAMOTIDINE 20 MG: 20 TABLET ORAL at 17:17

## 2021-01-10 RX ADMIN — ZINC SULFATE 220 MG (50 MG) CAPSULE 220 MG: CAPSULE at 07:56

## 2021-01-10 RX ADMIN — SODIUM BICARBONATE 125 ML/HR: 84 INJECTION, SOLUTION INTRAVENOUS at 14:36

## 2021-01-10 RX ADMIN — INSULIN LISPRO 4 UNITS: 100 INJECTION, SOLUTION INTRAVENOUS; SUBCUTANEOUS at 11:11

## 2021-01-10 RX ADMIN — OXYCODONE HYDROCHLORIDE AND ACETAMINOPHEN 1000 MG: 500 TABLET ORAL at 22:00

## 2021-01-10 RX ADMIN — SODIUM BICARBONATE 50 MEQ: 84 INJECTION INTRAVENOUS at 11:09

## 2021-01-10 RX ADMIN — FLUDROCORTISONE ACETATE 0.05 MG: 0.1 TABLET ORAL at 14:47

## 2021-01-10 RX ADMIN — INSULIN DETEMIR 15 UNITS: 100 INJECTION, SOLUTION SUBCUTANEOUS at 21:44

## 2021-01-10 RX ADMIN — INSULIN HUMAN 10 UNITS: 100 INJECTION, SOLUTION PARENTERAL at 08:34

## 2021-01-10 RX ADMIN — INSULIN LISPRO 4 UNITS: 100 INJECTION, SOLUTION INTRAVENOUS; SUBCUTANEOUS at 21:47

## 2021-01-10 RX ADMIN — FAMOTIDINE 20 MG: 20 TABLET ORAL at 07:56

## 2021-01-10 RX ADMIN — ENOXAPARIN SODIUM 80 MG: 80 INJECTION SUBCUTANEOUS at 21:44

## 2021-01-10 RX ADMIN — Medication 2000 UNITS: at 07:56

## 2021-01-10 RX ADMIN — DOXYCYCLINE 100 MG: 100 CAPSULE ORAL at 21:44

## 2021-01-10 RX ADMIN — INSULIN LISPRO 5 UNITS: 100 INJECTION, SOLUTION INTRAVENOUS; SUBCUTANEOUS at 17:24

## 2021-01-10 RX ADMIN — ENOXAPARIN SODIUM 80 MG: 80 INJECTION SUBCUTANEOUS at 07:57

## 2021-01-10 RX ADMIN — CEFTRIAXONE SODIUM 1000 MG: 10 INJECTION, POWDER, FOR SOLUTION INTRAVENOUS at 21:44

## 2021-01-10 RX ADMIN — DOXYCYCLINE 100 MG: 100 CAPSULE ORAL at 07:56

## 2021-01-10 NOTE — ASSESSMENT & PLAN NOTE
Lab Results   Component Value Date    HGBA1C 10 5 (H) 01/05/2021       Recent Labs     01/09/21  1542 01/09/21  2109 01/10/21  0601 01/10/21  1103   POCGLU 363* 327* 319* 364*       Blood Sugar Average: Last 72 hrs:  · (P) 846 [id number] Check A1c  · Blood glucose checks q i d , hypoglycemia protocol  · Will hold home metformin  · Sliding scale insulin  Given persistently elevated blood sugars will start patient on standing dose of Levemir at night - 10u

## 2021-01-10 NOTE — ASSESSMENT & PLAN NOTE
No obvious etiology for this  Also has hyponatremia  Suspicion for adrenal insufficiency either COVID induced or dexamethasone induced  Has received Kayexalate previously however potassium continues to trend up  EKG obtained  Will treat hyperkalemia with calcium gluconate, insulin plus dextrose, albuterol, Lasix  Repeat potassium in the afternoon  Consult Nephrology  See plan under adrenal nodule

## 2021-01-10 NOTE — CONSULTS
NEPHROLOGY CONSULTATION NOTE    Patient: Shawna James               Sex: male          DOA: 1/4/2021  8:02 PM   YOB: 1975        Age:  48 y o         LOS:  LOS: 6 days     REFERRING PHYSICIAN: Dr Josefina Gonzalez / Jackie Campuzano:  Hyperkalemia    DATE OF CONSULTATION / SERVICE: 1/10/2021    ADMISSION DIAGNOSIS: Pneumonia due to COVID-19 virus     CHIEF COMPLAINT     Shortness of breath    HPI     This is a 48 y o  M with PMH of DM-2 who was diagnosed with COVID-19 on 12/23 now presents to our facility on 1/5/21 with progressive SOB and generalized weakness  Patient underwent CTA of chest which revealed COVID-19 pneumonia  Patient presented with elevated creatinine of 1 41 mg/dl though improved during course of hospitalization  Patient was started on IV Decadron as treatment for infection and then noted to have progressive rise in blood sugars  Patient also noted to have rise in serum potassium on 1/7/21 up to 6 0  Concomitant hyperglycemia and hyponatremia has also been noted  Adrenal insufficiency in the setting of COVID-19 infection was suspected and a random cortisol noted to be 3  Patient was started on Florinef and Hydrocortisone today  Currently, patient is noted to be eating lunch and admits to improvement in SOB  Incidentally, upon CTA, patient noted to have a adrenal nodule measuring 3 5 cm  Currently patient denies nausea, vomiting, headache, dizziness, abdominal pain, constipation or rash  PAST MEDICAL HISTORY     History reviewed  No pertinent past medical history  PAST SURGICAL HISTORY     History reviewed  No pertinent surgical history      ALLERGIES     No Known Allergies    SOCIAL HISTORY     Social History     Substance and Sexual Activity   Alcohol Use Not Currently     Social History     Substance and Sexual Activity   Drug Use Not Currently     Social History     Tobacco Use   Smoking Status Never Smoker   Smokeless Tobacco Never Used       FAMILY HISTORY     History reviewed  No pertinent family history      CURRENT MEDICATIONS       Current Facility-Administered Medications:     ascorbic acid (VITAMIN C) tablet 1,000 mg, 1,000 mg, Oral, Q12H [facility], Simran Nicholas PA-C, 1,000 mg at 01/10/21 0756    benzonatate (TESSALON PERLES) capsule 100 mg, 100 mg, Oral, TID, Carmela Enriquez MD    ceftriaxone (ROCEPHIN) 1 g/50 mL in dextrose IVPB, 1,000 mg, Intravenous, Q24H, Manisha Dias PA-C, Last Rate: 100 mL/hr at 01/09/21 2247, 1,000 mg at 01/09/21 2247    cholecalciferol (VITAMIN D3) tablet 2,000 Units, 2,000 Units, Oral, Daily, Manisha Dias PA-C, 2,000 Units at 01/10/21 0756    dextromethorphan-guaiFENesin (ROBITUSSIN DM) oral syrup 10 mL, 10 mL, Oral, Q4H PRN, Carmela Enriquez MD    doxycycline hyclate (VIBRAMYCIN) capsule 100 mg, 100 mg, Oral, Q12H, Simran Nicholas PA-C, 100 mg at 01/10/21 0756    enoxaparin (LOVENOX) subcutaneous injection 80 mg, 1 mg/kg, Subcutaneous, Q12H [facility], Carmela Enriquez MD, 80 mg at 01/10/21 0757    famotidine (PEPCID) tablet 20 mg, 20 mg, Oral, BID, Simran Nicholas PA-C, 20 mg at 01/10/21 0756    fludrocortisone (FLORINEF) tablet 0 05 mg, 0 05 mg, Oral, Daily, Carmela Enriquez MD    hydrocortisone sodium succinate (PF) (Solu-CORTEF) injection 25 mg, 25 mg, Intravenous, Q8H [facility], Carmela Enriquez MD    insulin detemir (LEVEMIR) subcutaneous injection 10 Units, 10 Units, Subcutaneous, HS, Carmela Enriquez MD, 10 Units at 01/09/21 2248    insulin lispro (HumaLOG) 100 units/mL subcutaneous injection 1-5 Units, 1-5 Units, Subcutaneous, TID AC, 4 Units at 01/10/21 1111 **AND** Fingerstick Glucose (POCT), , , TID AC, Simran Nicholas PA-C    insulin lispro (HumaLOG) 100 units/mL subcutaneous injection 1-5 Units, 1-5 Units, Subcutaneous, HS, Simran Nicholas PA-C, 3 Units at 01/09/21 9328    Lidocaine Viscous HCl (XYLOCAINE) 2 % mucosal solution 15 mL, 15 mL, Swish & Spit, 4x Daily PRN, MD Brandon Oneal melatonin tablet 3 mg, 3 mg, Oral, HS, Mine Ness PA-C, 3 mg at 01/09/21 2249    zinc sulfate (ZINCATE) capsule 220 mg, 220 mg, Oral, Daily, 220 mg at 01/10/21 7076 **FOLLOWED BY** [START ON 1/12/2021] multivitamin-minerals (CENTRUM ADULTS) tablet 1 tablet, 1 tablet, Oral, Daily, Yara Miller PA-C    sodium bicarbonate 75 mEq in sodium chloride 0 45 % 1,000 mL infusion, 125 mL/hr, Intravenous, Continuous, Tk Zepeda MD    REVIEW OF SYSTEMS     Review of Systems   Constitutional: Negative  HENT: Negative  Eyes: Negative  Respiratory: Positive for cough  Cardiovascular: Negative  Gastrointestinal: Negative  Endocrine: Negative  Genitourinary: Negative  Musculoskeletal: Negative  Skin: Negative  Allergic/Immunologic: Negative  Neurological: Negative  Hematological: Negative  All other systems reviewed and are negative  OBJECTIVE     Current Weight: Weight - Scale: 77 4 kg (170 lb 10 2 oz)  Vitals:    01/10/21 1127   BP: 120/72   Pulse: 77   Resp: 18   Temp: 98 3 °F (36 8 °C)   SpO2: 91%     Body mass index is 25 2 kg/m²  Intake/Output Summary (Last 24 hours) at 1/10/2021 1331  Last data filed at 1/9/2021 2103  Gross per 24 hour   Intake 1640 ml   Output    Net 1640 ml       PHYSICAL EXAMINATION     Physical Exam  HENT:      Head: Normocephalic and atraumatic  Eyes:      Pupils: Pupils are equal, round, and reactive to light  Neck:      Musculoskeletal: Neck supple  Vascular: No JVD  Cardiovascular:      Rate and Rhythm: Normal rate and regular rhythm  Heart sounds: Normal heart sounds  No murmur  No friction rub  Pulmonary:      Effort: Pulmonary effort is normal       Breath sounds: Normal breath sounds  Comments: Coarse breath sounds  Abdominal:      General: Bowel sounds are normal  There is no distension  Palpations: Abdomen is soft  Tenderness: There is no abdominal tenderness  There is no rebound     Musculoskeletal: General: No tenderness  Skin:     General: Skin is dry  Findings: No rash  Neurological:      Mental Status: He is alert and oriented to person, place, and time  LAB RESULTS        Results from last 7 days   Lab Units 01/10/21  0528 01/09/21  0526 01/08/21  0925 01/07/21  1404 01/07/21  0539 01/06/21  1000 01/05/21  0617 01/04/21  2052   WBC Thousand/uL 6 48 6 72 6 90  --  7 00 8 43 6 80 7 38   HEMOGLOBIN g/dL 13 4 13 5 13 8  --  13 6 12 1 12 3 13 5   HEMATOCRIT % 40 9 40 7 41 6  --  42 0 36 9 37 7 40 9   PLATELETS Thousands/uL 582* 590* 568*  --  527* 456* 405* 425*   POTASSIUM mmol/L 6 5* 5 2 4 6 5 1 6 0* 4 9 4 8 4 2   CHLORIDE mmol/L 101 99* 99* 100 101 102 103 99*   CO2 mmol/L 28 24 26 24 23 22 25 27   BUN mg/dL 29* 26* 22 27* 25 27* 20 18   CREATININE mg/dL 1 14 1 04 1 17 1 04 1 04 1 03 1 19 1 41*   EGFR ml/min/1 73sq m 87 98 85 98 98 100 84 68   CALCIUM mg/dL 9 5 9 1 8 9 8 7 9 1 8 3 8 6 8 7   MAGNESIUM mg/dL  --   --   --   --   --   --  3 0* 2 9*       I have personally reviewed the old medical records and patient's previously known baseline creatinine level is ~1 1    RADIOLOGY RESULTS     Results for orders placed during the hospital encounter of 01/04/21   XR chest 1 view portable    Narrative CHEST     INDICATION:   Shortness of breath, positive for COVID  COMPARISON:  None    EXAM PERFORMED/VIEWS:  XR CHEST PORTABLE      FINDINGS:    Cardiomediastinal silhouette appears unremarkable  Bilateral lung opacification, most prominent in the lung bases  Low lung volumes  Osseous structures appear within normal limits for patient age  Impression 1  Bilateral lung opacification, right greater than left and most prominent in the lung bases  2   Low lung volumes              Workstation performed: [id number]         PLAN / RECOMMENDATIONS      48 y o  M with PMH of DM-2, COVID-19 infection in Dec 2020 who p/w SOB and found to have COVID-19 related pneumonia    1) Hyperkalemia: Etiology related to hyperglycemia induced efflux of potassium + probable component of adrenal insufficiency 2/2 infection causing decreased ACTH production further leading to decreased aldosterone secretion  Will start 1/2NS +75 meq NaHCO3 to cause kaliuresis + shift of potassium inside cells  Initiated on Cortef and Florinef by SLIM  Recommend strict glycemic control in order to prevent surge of potassium   BMP in AM  Low potassium diet  Urine potassium    2) Adrenal adenoma: Noted 3 5 cm adrenal nodule  Patient will require outpatient f/u including repeating CT scan in 1 year to monitor growth  3) COVID-19 pneumonia: Ceftriaxone/Doxycycline/steroids  Improving symptomatically    4) DM-2: Blood sugars have been above target likely due to use of steroids  This needs to be aggressively lowered to prevent risk of recurrent hyperkalemia  5) Hyponatremia: Serum sodium of 133 meq/L likely due to hyperglycemia  Corrected sodium is 136 approximately    Thank you for the consultation to participate in patient's care  I have personally discussed my plan with the referring physician       Yazmin Gonzales MD    1/10/2021

## 2021-01-10 NOTE — ASSESSMENT & PLAN NOTE
· Creatinine peaked 1 41  · Denies history of CKD, no baseline per review of chart  · Now 1 0 which is adequate for him    · Continue to monitor

## 2021-01-10 NOTE — PROGRESS NOTES
Pt refusing to have sugar rechecked after giving IV dextrose and insulin  Pt removed heart monitor  Pt refusing vitals

## 2021-01-10 NOTE — ASSESSMENT & PLAN NOTE
· Incidental right adrenal nodule noted on CT a  · Per radiology follow-up in 1 year, please let patient know on discharge  · Cortisol level actually noted to be low - random cortisol 3 2  Given this plus electrolyte abnormalities such as hyponatremia and hyperkalemia question if this patient has adrenal insufficiency  I question if the dexamethasone could be causing this alone or if the COVID itself is unmasking adrenal insufficiency in this patient with this adrenal nodule  Given the above will change dexamethasone to hydrocortisone and add florinef 0 05mg/day  Consult Nephrology regarding hyperkalemia and hyponatremia

## 2021-01-10 NOTE — PROGRESS NOTES
Progress Note - Analilia Dies 1975, 48 y o  male MRN: [id number]    Unit/Bed#: -01 Encounter: [id number]    Primary Care Provider: No primary care provider on file  Date and time admitted to hospital: 1/4/2021  8:02 PM        * Pneumonia due to COVID-19 virus  Assessment & Plan  · Reports being diagnosed on 12/23 with COVID-19  · Since this past Sunday has reported feeling increased generalized weakness, fatigue, shortness of breath with productive cough  · Will follow mild COVID protocol as patient requiring 2 L nasal cannula which will continue  Chest x-ray revealing COVID pneumonia  · Due to being diagnosed 2 weeks ago with COVID-19, will hold off on IV remdesivir  · CT A negative for PE, revealing ground-glass COVID pneumonia appearance  · D-dimer elevated at 1 44, negative for PE, continue to trend    Patient continues to improve in a daily basis regarding the COVID  Plan:  Continue vitamin-C, D, zinc  Will DC IV Decadron 6 mg q d  given hyponatremia/hypokalemia/low cortisol and replace with hydrocortisone 25 TID + florinef 0 05 daily  Continue IV ceftriaxone 1 g q d  And doxycycline p o  100 mg q 12 hours   Continue to Monitor clinically, ventilatory status      Hyperkalemia  Assessment & Plan  No obvious etiology for this  Also has hyponatremia  Suspicion for adrenal insufficiency either COVID induced or dexamethasone induced  Has received Kayexalate previously however potassium continues to trend up  EKG obtained  Will treat hyperkalemia with calcium gluconate, insulin plus dextrose, albuterol, Lasix  Repeat potassium in the afternoon  Consult Nephrology  See plan under adrenal nodule  Adrenal nodule (HCC)  Assessment & Plan  · Incidental right adrenal nodule noted on CT a  · Per radiology follow-up in 1 year, please let patient know on discharge  · Cortisol level actually noted to be low - random cortisol 3 2    Given this plus electrolyte abnormalities such as hyponatremia and hyperkalemia question if this patient has adrenal insufficiency  I question if the dexamethasone could be causing this alone or if the COVID itself is unmasking adrenal insufficiency in this patient with this adrenal nodule  Given the above will change dexamethasone to hydrocortisone and add florinef 0 05mg/day  Consult Nephrology regarding hyperkalemia and hyponatremia  Enlarged thyroid gland  Assessment & Plan  · Incidental on CT outpatient follow-up  Sepsis ([facility] )  Assessment & Plan  · Currently meeting criteria due to tachycardia, tachypnea, febrile in setting of COVID-19 pneumonia  · Lactic acid WNL  · Blood cultures negative  · Continue antibiotics      See plan under COVID PNM    Transaminitis  Assessment & Plan  · Most likely in setting COVID-19 pneumonia  · Monitor CMP    Diabetes mellitus type 1 [facility])  Assessment & Plan  Lab Results   Component Value Date    HGBA1C 10 5 (H) 01/05/2021       Recent Labs     01/09/21  1542 01/09/21  2109 01/10/21  0601 01/10/21  1103   POCGLU 363* 327* 319* 364*       Blood Sugar Average: Last 72 hrs:  · (P) 080 [id number] Check A1c  · Blood glucose checks q i d , hypoglycemia protocol  · Will hold home metformin  · Sliding scale insulin  Given persistently elevated blood sugars will start patient on standing dose of Levemir at night - 10u    JUAN JOSE (acute kidney injury) ([facility] )  Assessment & Plan  · Creatinine peaked 1 41  · Denies history of CKD, no baseline per review of chart  · Now 1 0 which is adequate for him  · Continue to monitor    VTE Pharmacologic Prophylaxis:   Pharmacologic: Enoxaparin (Lovenox)  Mechanical VTE Prophylaxis in Place: Yes    Patient Centered Rounds: I have performed bedside rounds with nursing staff today  Discussions with Specialists or Other Care Team Provider:  Nephrology    Education and Discussions with Family / Patient:  Discussed with Soheila Muir the pharmacist over the phone    Time Spent for Care: 30 minutes    More than 50% of total time spent on counseling and coordination of care as described above  Current Length of Stay: 6 day(s)    Current Patient Status: Inpatient   Certification Statement: The patient will continue to require additional inpatient hospital stay due to Need for correction of hyperkalemia  Discharge Plan:  Once stable    Code Status: Level 1 - Full Code      Subjective:     Patient evaluated this morning  He is frustrated with his situation as his have high potassium this morning and he does not like the treatments  Despite that he feels is okay as far as COVID goes  Denies nausea, vomiting, diarrhea, constipation  No other events reported  Objective:     Vitals:   Temp (24hrs), Av 7 °F (36 5 °C), Min:97 3 °F (36 3 °C), Max:98 3 °F (36 8 °C)    Temp:  [97 3 °F (36 3 °C)-98 3 °F (36 8 °C)] 98 3 °F (36 8 °C)  HR:  [77-88] 77  Resp:  [17-18] 18  BP: (106-120)/(63-72) 120/72  SpO2:  [90 %-92 %] 91 %  Body mass index is 25 2 kg/m²  Input and Output Summary (last 24 hours): Intake/Output Summary (Last 24 hours) at 1/10/2021 1220  Last data filed at 2021 2103  Gross per 24 hour   Intake 1640 ml   Output    Net 1640 ml       Physical Exam:     Physical Exam  Vitals signs and nursing note reviewed  Constitutional:       Appearance: Normal appearance  Comments: Middle-age male in bed, awake   HENT:      Head: Normocephalic and atraumatic  Right Ear: External ear normal       Left Ear: External ear normal       Nose: Nose normal  No congestion or rhinorrhea  Mouth/Throat:      Mouth: Mucous membranes are moist       Pharynx: Oropharynx is clear  No oropharyngeal exudate or posterior oropharyngeal erythema  Eyes:      General: No scleral icterus  Right eye: No discharge  Left eye: No discharge  Pupils: Pupils are equal, round, and reactive to light  Neck:      Musculoskeletal: Normal range of motion  No neck rigidity or muscular tenderness        Vascular: No carotid bruit  Cardiovascular:      Rate and Rhythm: Normal rate and regular rhythm  Pulses: Normal pulses  Heart sounds: No murmur  No friction rub  No gallop  Pulmonary:      Effort: Pulmonary effort is normal  No respiratory distress  Breath sounds: Normal breath sounds  No stridor  No wheezing, rhonchi or rales  Abdominal:      General: Abdomen is flat  Bowel sounds are normal  There is no distension  Palpations: Abdomen is soft  There is no mass  Tenderness: There is no abdominal tenderness  There is no guarding or rebound  Hernia: No hernia is present  Musculoskeletal: Normal range of motion  General: No swelling, tenderness, deformity or signs of injury  Lymphadenopathy:      Cervical: No cervical adenopathy  Skin:     General: Skin is warm and dry  Capillary Refill: Capillary refill takes less than 2 seconds  Coloration: Skin is not jaundiced or pale  Findings: No bruising or erythema  Neurological:      General: No focal deficit present  Mental Status: He is alert and oriented to person, place, and time  Mental status is at baseline  Cranial Nerves: No cranial nerve deficit  Sensory: No sensory deficit  Motor: No weakness  Coordination: Coordination normal       Deep Tendon Reflexes: Reflexes normal    Psychiatric:         Mood and Affect: Mood normal          Behavior: Behavior normal          Thought Content:  Thought content normal          Judgment: Judgment normal            Additional Data:     Labs:    Results from last 7 days   Lab Units 01/10/21  0528  01/04/21  2052   WBC Thousand/uL 6 48   < > 7 38   HEMOGLOBIN g/dL 13 4   < > 13 5   HEMATOCRIT % 40 9   < > 40 9   PLATELETS Thousands/uL 582*   < > 425*   BANDS PCT %  --   --  1   NEUTROS PCT % 71   < >  --    LYMPHS PCT % 20   < >  --    LYMPHO PCT %  --   --  8*   MONOS PCT % 7   < >  --    MONO PCT %  --   --  11   EOS PCT % 0   < > 0    < > = values in this interval not displayed  Results from last 7 days   Lab Units 01/10/21  0528  01/08/21  0925   SODIUM mmol/L 133*   < > 134*   POTASSIUM mmol/L 6 5*   < > 4 6   CHLORIDE mmol/L 101   < > 99*   CO2 mmol/L 28   < > 26   BUN mg/dL 29*   < > 22   CREATININE mg/dL 1 14   < > 1 17   ANION GAP mmol/L 4   < > 9   CALCIUM mg/dL 9 5   < > 8 9   ALBUMIN g/dL  --   --  2 3*   TOTAL BILIRUBIN mg/dL  --   --  0 40   ALK PHOS U/L  --   --  103   ALT U/L  --   --  53   AST U/L  --   --  21   GLUCOSE RANDOM mg/dL 337*   < > 314*    < > = values in this interval not displayed  Results from last 7 days   Lab Units 01/04/21  2052   INR  1 02     Results from last 7 days   Lab Units 01/10/21  1103 01/10/21  0601 01/09/21  2109 01/09/21  1542 01/09/21  1133 01/09/21  0524 01/08/21  2101 01/08/21  1605 01/08/21  1119 01/08/21  0930 01/08/21  0644 01/07/21  2112   POC GLUCOSE mg/dl 364* 319* 327* 363* 301* 260* 202* 246* 239* 263* 194* 292*     Results from last 7 days   Lab Units 01/05/21  0617   HEMOGLOBIN A1C % 10 5*     Results from last 7 days   Lab Units 01/05/21  0622 01/04/21 2052   LACTIC ACID mmol/L  --  1 7   PROCALCITONIN ng/ml 0 17 0 21           * I Have Reviewed All Lab Data Listed Above  * Additional Pertinent Lab Tests Reviewed: [facility] Admission Reviewed      Recent Cultures (last 7 days):     Results from last 7 days   Lab Units 01/04/21 2052   BLOOD CULTURE  No Growth After 5 Days  No Growth After 5 Days         Last 24 Hours Medication List:   Current Facility-Administered Medications   Medication Dose Route Frequency Provider Last Rate    ascorbic acid  1,000 mg Oral Q12H [address] Jacobson Pratik, PA-C      benzonatate  100 mg Oral TID Tricia Adames MD      cefTRIAXone  1,000 mg Intravenous Q24H Kirsty Starring, PA-C 1,000 mg (01/09/21 5729)    cholecalciferol  2,000 Units Oral Daily Kirsty Christie PA-C      dextromethorphan-guaiFENesin  10 mL Oral Q4H PRN Boo Brandt Christopher Quevedo MD      doxycycline hyclate  100 mg Oral Q12H Manuel Turner PA-C      enoxaparin  1 mg/kg Subcutaneous Q12H [address] Timothy Gold MD      famotidine  20 mg Oral BID Manuel Turner PA-C      fludrocortisone  0 05 mg Oral Daily Timothy Gold MD      hydrocortisone sodium succinate  25 mg Intravenous [facility] Timothy Gold MD      insulin detemir  10 Units Subcutaneous HS Timothy Gold MD      insulin lispro  1-5 Units Subcutaneous TID AC Manuel Turner PA-C      insulin lispro  1-5 Units Subcutaneous HS Manuel Turner PA-C      Lidocaine Viscous HCl  15 mL Swish & Spit 4x Daily PRN Timothy Gold MD      melatonin  3 mg Oral HS Amanda Burnett PA-C      zinc sulfate  220 mg Oral Daily Manuel Turner PA-C      Followed by   Stevan Luong ON 1/12/2021] multivitamin-minerals  1 tablet Oral Daily Manuel Turner PA-C          Today, Patient Was Seen By: Timothy Gold MD    ** Please Note: Dictation voice to text software may have been used in the creation of this document   **

## 2021-01-10 NOTE — ASSESSMENT & PLAN NOTE
· Currently meeting criteria due to tachycardia, tachypnea, febrile in setting of COVID-19 pneumonia  · Lactic acid WNL  · Blood cultures negative  · Continue antibiotics      See plan under COVID [facility]

## 2021-01-11 VITALS
OXYGEN SATURATION: 98 % | RESPIRATION RATE: 20 BRPM | TEMPERATURE: 98.3 F | HEART RATE: 60 BPM | BODY MASS INDEX: 25.27 KG/M2 | SYSTOLIC BLOOD PRESSURE: 132 MMHG | DIASTOLIC BLOOD PRESSURE: 86 MMHG | HEIGHT: 69 IN | WEIGHT: 170.64 LBS

## 2021-01-11 LAB
ALBUMIN SERPL BCP-MCNC: 2.3 G/DL (ref 3.5–5)
ALP SERPL-CCNC: 88 U/L (ref 46–116)
ALT SERPL W P-5'-P-CCNC: 157 U/L (ref 12–78)
ANION GAP SERPL CALCULATED.3IONS-SCNC: 6 MMOL/L (ref 4–13)
ANION GAP SERPL CALCULATED.3IONS-SCNC: 7 MMOL/L (ref 4–13)
AST SERPL W P-5'-P-CCNC: 38 U/L (ref 5–45)
BASOPHILS # BLD AUTO: 0.01 THOUSANDS/ΜL (ref 0–0.1)
BASOPHILS NFR BLD AUTO: 0 % (ref 0–1)
BILIRUB SERPL-MCNC: 0.3 MG/DL (ref 0.2–1)
BUN SERPL-MCNC: 21 MG/DL (ref 5–25)
BUN SERPL-MCNC: 22 MG/DL (ref 5–25)
CALCIUM ALBUM COR SERPL-MCNC: 10.3 MG/DL (ref 8.3–10.1)
CALCIUM SERPL-MCNC: 8.9 MG/DL (ref 8.3–10.1)
CALCIUM SERPL-MCNC: 9 MG/DL (ref 8.3–10.1)
CHLORIDE SERPL-SCNC: 101 MMOL/L (ref 100–108)
CHLORIDE SERPL-SCNC: 101 MMOL/L (ref 100–108)
CO2 SERPL-SCNC: 26 MMOL/L (ref 21–32)
CO2 SERPL-SCNC: 27 MMOL/L (ref 21–32)
CREAT SERPL-MCNC: 0.98 MG/DL (ref 0.6–1.3)
CREAT SERPL-MCNC: 0.99 MG/DL (ref 0.6–1.3)
EOSINOPHIL # BLD AUTO: 0.05 THOUSAND/ΜL (ref 0–0.61)
EOSINOPHIL NFR BLD AUTO: 1 % (ref 0–6)
ERYTHROCYTE [DISTWIDTH] IN BLOOD BY AUTOMATED COUNT: 11.9 % (ref 11.6–15.1)
GFR SERPL CREATININE-BSD FRML MDRD: 104 ML/MIN/1.73SQ M
GFR SERPL CREATININE-BSD FRML MDRD: 105 ML/MIN/1.73SQ M
GLUCOSE SERPL-MCNC: 229 MG/DL (ref 65–140)
GLUCOSE SERPL-MCNC: 274 MG/DL (ref 65–140)
GLUCOSE SERPL-MCNC: 292 MG/DL (ref 65–140)
GLUCOSE SERPL-MCNC: 295 MG/DL (ref 65–140)
GLUCOSE SERPL-MCNC: 331 MG/DL (ref 65–140)
GLUCOSE SERPL-MCNC: 333 MG/DL (ref 65–140)
HCT VFR BLD AUTO: 37.8 % (ref 36.5–49.3)
HGB BLD-MCNC: 12.5 G/DL (ref 12–17)
IMM GRANULOCYTES # BLD AUTO: 0.08 THOUSAND/UL (ref 0–0.2)
IMM GRANULOCYTES NFR BLD AUTO: 1 % (ref 0–2)
LYMPHOCYTES # BLD AUTO: 2.12 THOUSANDS/ΜL (ref 0.6–4.47)
LYMPHOCYTES NFR BLD AUTO: 27 % (ref 14–44)
MCH RBC QN AUTO: 29.6 PG (ref 26.8–34.3)
MCHC RBC AUTO-ENTMCNC: 33.1 G/DL (ref 31.4–37.4)
MCV RBC AUTO: 90 FL (ref 82–98)
MONOCYTES # BLD AUTO: 0.74 THOUSAND/ΜL (ref 0.17–1.22)
MONOCYTES NFR BLD AUTO: 9 % (ref 4–12)
NEUTROPHILS # BLD AUTO: 4.88 THOUSANDS/ΜL (ref 1.85–7.62)
NEUTS SEG NFR BLD AUTO: 62 % (ref 43–75)
NRBC BLD AUTO-RTO: 0 /100 WBCS
PLATELET # BLD AUTO: 526 THOUSANDS/UL (ref 149–390)
PMV BLD AUTO: 8.4 FL (ref 8.9–12.7)
POTASSIUM SERPL-SCNC: 4.4 MMOL/L (ref 3.5–5.3)
POTASSIUM SERPL-SCNC: 4.5 MMOL/L (ref 3.5–5.3)
POTASSIUM UR-SCNC: 14.6 MMOL/L
PROT SERPL-MCNC: 6.7 G/DL (ref 6.4–8.2)
RBC # BLD AUTO: 4.22 MILLION/UL (ref 3.88–5.62)
SODIUM SERPL-SCNC: 134 MMOL/L (ref 136–145)
SODIUM SERPL-SCNC: 134 MMOL/L (ref 136–145)
WBC # BLD AUTO: 7.88 THOUSAND/UL (ref 4.31–10.16)

## 2021-01-11 PROCEDURE — 85025 COMPLETE CBC W/AUTO DIFF WBC: CPT | Performed by: INTERNAL MEDICINE

## 2021-01-11 PROCEDURE — 80053 COMPREHEN METABOLIC PANEL: CPT | Performed by: INTERNAL MEDICINE

## 2021-01-11 PROCEDURE — 82948 REAGENT STRIP/BLOOD GLUCOSE: CPT

## 2021-01-11 PROCEDURE — 99239 HOSP IP/OBS DSCHRG MGMT >30: CPT | Performed by: INTERNAL MEDICINE

## 2021-01-11 PROCEDURE — 80048 BASIC METABOLIC PNL TOTAL CA: CPT | Performed by: INTERNAL MEDICINE

## 2021-01-11 PROCEDURE — 99232 SBSQ HOSP IP/OBS MODERATE 35: CPT | Performed by: INTERNAL MEDICINE

## 2021-01-11 RX ORDER — BENZONATATE 100 MG/1
100 CAPSULE ORAL 3 TIMES DAILY PRN
Qty: 20 CAPSULE | Refills: 0 | Status: SHIPPED | OUTPATIENT
Start: 2021-01-11

## 2021-01-11 RX ORDER — ACETAMINOPHEN 325 MG/1
650 TABLET ORAL EVERY 6 HOURS PRN
Status: DISCONTINUED | OUTPATIENT
Start: 2021-01-11 | End: 2021-01-12 | Stop reason: HOSPADM

## 2021-01-11 RX ORDER — SODIUM CHLORIDE 9 MG/ML
100 INJECTION, SOLUTION INTRAVENOUS CONTINUOUS
Status: DISCONTINUED | OUTPATIENT
Start: 2021-01-11 | End: 2021-01-12 | Stop reason: HOSPADM

## 2021-01-11 RX ORDER — MELATONIN
2000 DAILY
Qty: 60 TABLET | Refills: 0 | Status: SHIPPED | OUTPATIENT
Start: 2021-01-12 | End: 2021-02-11

## 2021-01-11 RX ORDER — ZINC SULFATE 50(220)MG
220 CAPSULE ORAL DAILY
Qty: 30 CAPSULE | Refills: 0 | Status: SHIPPED | OUTPATIENT
Start: 2021-01-11 | End: 2021-02-10

## 2021-01-11 RX ADMIN — ZINC SULFATE 220 MG (50 MG) CAPSULE 220 MG: CAPSULE at 08:34

## 2021-01-11 RX ADMIN — DOXYCYCLINE 100 MG: 100 CAPSULE ORAL at 22:13

## 2021-01-11 RX ADMIN — SODIUM BICARBONATE 125 ML/HR: 84 INJECTION, SOLUTION INTRAVENOUS at 08:33

## 2021-01-11 RX ADMIN — OXYCODONE HYDROCHLORIDE AND ACETAMINOPHEN 1000 MG: 500 TABLET ORAL at 08:33

## 2021-01-11 RX ADMIN — FAMOTIDINE 20 MG: 20 TABLET ORAL at 17:26

## 2021-01-11 RX ADMIN — MELATONIN 3 MG: at 22:15

## 2021-01-11 RX ADMIN — HYDROCORTISONE SODIUM SUCCINATE 25 MG: 100 INJECTION, POWDER, FOR SOLUTION INTRAMUSCULAR; INTRAVENOUS at 06:29

## 2021-01-11 RX ADMIN — INSULIN LISPRO 2 UNITS: 100 INJECTION, SOLUTION INTRAVENOUS; SUBCUTANEOUS at 17:27

## 2021-01-11 RX ADMIN — CEFTRIAXONE SODIUM 1000 MG: 10 INJECTION, POWDER, FOR SOLUTION INTRAVENOUS at 22:14

## 2021-01-11 RX ADMIN — BENZONATATE 100 MG: 100 CAPSULE ORAL at 16:26

## 2021-01-11 RX ADMIN — DOXYCYCLINE 100 MG: 100 CAPSULE ORAL at 08:34

## 2021-01-11 RX ADMIN — OXYCODONE HYDROCHLORIDE AND ACETAMINOPHEN 1000 MG: 500 TABLET ORAL at 22:13

## 2021-01-11 RX ADMIN — FLUDROCORTISONE ACETATE 0.05 MG: 0.1 TABLET ORAL at 08:34

## 2021-01-11 RX ADMIN — ACETAMINOPHEN 650 MG: 325 TABLET, FILM COATED ORAL at 18:40

## 2021-01-11 RX ADMIN — BENZONATATE 100 MG: 100 CAPSULE ORAL at 22:13

## 2021-01-11 RX ADMIN — HYDROCORTISONE SODIUM SUCCINATE 25 MG: 100 INJECTION, POWDER, FOR SOLUTION INTRAMUSCULAR; INTRAVENOUS at 14:26

## 2021-01-11 RX ADMIN — BENZONATATE 100 MG: 100 CAPSULE ORAL at 08:34

## 2021-01-11 RX ADMIN — FAMOTIDINE 20 MG: 20 TABLET ORAL at 08:33

## 2021-01-11 RX ADMIN — Medication 2000 UNITS: at 08:34

## 2021-01-11 RX ADMIN — ENOXAPARIN SODIUM 80 MG: 80 INJECTION SUBCUTANEOUS at 08:34

## 2021-01-11 RX ADMIN — SODIUM CHLORIDE 100 ML/HR: 0.9 INJECTION, SOLUTION INTRAVENOUS at 09:17

## 2021-01-11 RX ADMIN — INSULIN LISPRO 4 UNITS: 100 INJECTION, SOLUTION INTRAVENOUS; SUBCUTANEOUS at 08:36

## 2021-01-11 RX ADMIN — INSULIN LISPRO 2 UNITS: 100 INJECTION, SOLUTION INTRAVENOUS; SUBCUTANEOUS at 11:57

## 2021-01-11 NOTE — DISCHARGE SUMMARY
Discharge- Jos Herrera 1975, 48 y o  male MRN: [id number]    Unit/Bed#: -01 Encounter: [id number]    Primary Care Provider: No primary care provider on file  Date and time admitted to hospital: 1/4/2021  8:02 PM    Discharge date was actually 01/12/2021    * Pneumonia due to COVID-19 virus  Assessment & Plan  · Reports being diagnosed on 12/23 with COVID-19  · Since this past Sunday has reported feeling increased generalized weakness, fatigue, shortness of breath with productive cough    Was hospitalized and treated vitamin-C, vitamin-D, zinc, IV dexamethasone, ceftriaxone, doxycycline  He responded very well and is currently off oxygen  He has been discharged in stable condition, was oriented to self quarentine, he should follow-up as an outpatient  Hyperkalemia  Assessment & Plan  He did have hyperkalemia during hospitalization of potassium as high as 6 5  No obvious etiology for this  Also had mild hyponatremia  Suspicion for adrenal insufficiency due to dexamethasone used for COVID  Unlikely primary due to normal BP and bicarbonate  Hyperkalemia was treated and currently potassium is better  Since patient will be off steroids suspect that electrolyte abnormalities will resolve now  Outpatient follow-up  Adrenal nodule (HCC)  Assessment & Plan  · Incidental right adrenal nodule noted on CT a  Per radiology follow-up in 1 year    Enlarged thyroid gland  Assessment & Plan  · Incidental on CT outpatient follow-up      Transaminitis  Assessment & Plan  · Most likely in setting COVID-19 pneumonia  · Monitor CMP as outpatient    Diabetes mellitus type 1 [facility])  Assessment & Plan  Lab Results   Component Value Date    HGBA1C 10 5 (H) 01/05/2021       Recent Labs     01/10/21  1540 01/10/21  2026 01/11/21  0638 01/11/21  1059   POCGLU 399* 345* 274* 229*       Blood Sugar Average: Last 72 hrs:  · (P) 222 [id number] Check A1c  Blood sugars were uncontrolled during the hospitalization due to steroids but now that will stop steroids they will likely be improved  Continue metformin  JUAN JOSE (acute kidney injury) ([facility] [address] )  Assessment & Plan  · Creatinine peaked 1 41  · Denies history of CKD, no baseline per review of chart  · Now 1 0 which is adequate for him  · Continue to monitor as outpatient      Discharging Physician / Practitioner: Loan Stewart MD  PCP: No primary care provider on file  Admission Date:   Admission Orders (From admission, onward)     Ordered        01/04/21 2144  Inpatient Admission  Once                   Discharge Date: 01/11/21      ·     Significant Findings / Test Results:   · 01/04/2021     INDICATION:   PE suspected, intermediate prob, positive D-dimer  Dyspnea, hypoxic, elevated D-dimer  Patient has confirmed COVID-19      COMPARISON: Chest x-ray dated  1/4/2021     TECHNIQUE: CTA examination of the chest was performed using angiographic technique according to a protocol specifically tailored to evaluate for pulmonary embolism  Axial, sagittal, and coronal 2D reformatted images were created from the source data and   submitted for interpretation  In addition, coronal 3D MIP postprocessing was performed on the acquisition scanner        Radiation dose length product (DLP) for this visit:  445 mGy-cm     This examination, like all CT scans performed in the [facility], was performed utilizing techniques to minimize radiation dose exposure, including the use of iterative   reconstruction and automated exposure control      IV Contrast:  100 mL of iohexol (OMNIPAQUE)     FINDINGS:     PULMONARY ARTERIAL TREE:  Some images are suboptimal secondary to respiratory motion which decreases sensitivity for evaluation of peripheral pulmonary emboli, subject to this, no pulmonary embolism is seen      LUNGS:  There are multiple focal and confluent areas of groundglass and alveolar opacity, especially in the periphery in the bilateral upper lobes and scattered in the right middle lobe and the left lingula with more prominent and diffuse opacities in   the bilateral lower lobes which correlates with the patient's history of multifocal infection and Covid 19      PLEURA:  Unremarkable      HEART/GREAT VESSELS:  Unremarkable for patient's age      MEDIASTINUM AND LOCO:  Unremarkable      CHEST WALL AND LOWER NECK:   Diffuse enlargement of the thyroid gland  Otherwise grossly unremarkable      VISUALIZED STRUCTURES IN THE UPPER ABDOMEN:  There is a 3 5 x 2 8 cm nodule in the right adrenal gland  Otherwise grossly unremarkable     OSSEOUS STRUCTURES:  No acute fracture or destructive osseous lesion         IMPRESSION:     Some images are suboptimal secondary to respiratory motion which decreases sensitivity for evaluation of peripheral pulmonary emboli, subject to this, no pulmonary embolism is seen      Multiple focal and confluent areas of groundglass and alveolar opacity, more prominent in the periphery and in the bilateral lower lobes which correlates with the patient's history of multifocal infection and COVID-19      3 5 x 2 8 cm nodule in the right adrenal gland, nonspecific but one-year follow-up recommended to exclude interval change/growth      Diffuse enlargement of the thyroid gland, nonemergent correlation with the patient's thyroid function tests recommended      Other findings as above         Workstation performed: [id number]       Incidental Findings:   · There is an adrenal nodule that has to be followed up as mentioned above    Complications:  None        Reason for Admission:  Shortness of breath and cough    HPI:  Devyn Llamas is a 50 y o  male with PMH not limited to diabetes mellitus type 1 who presents with gradual worsening of shortness of breath with productive cough, generalized weakness and fatigue x2 days  He reports he was diagnosed 12/23 with COVID-19  Reports since then he has felt gradually worse especially over the last 2 days    Denies chest pain  Admits appetite has been okay  Reports his main complaint is that shortness of breath and weakness  [facility] Course: The patient presented with COVID-19 pneumonia as mentioned above  He was treated with vitamin-C, vitamin-D, zinc, IV dexamethasone, ceftriaxone and doxycycline  He responded well to COVID-19 treatment and eventually he was weaned off oxygen completely  His energy level is improved  He does have some residual cough which is supposed to be there for a while  His treatment was complicated by hyponatremia and hyperkalemia with low cortisol as well, this is likely secondary to adrenal insufficiency induced by dexamethasone which was using his treatment  His potassium is currently normal and I suspected to stay that way as long as we discontinued the steroids  He does not have any trouble with his blood pressure or bicarbonate so feel that adrenal insufficiency is less likely  Also regarding his adrenals he does have an incidental finding of adrenal nodule which is mentioned below and has to be followed up as outpatient  On 01/11/2021 he has been discharged in stable condition home, he was oriented to self quarantine for 20 days after onset of symptoms which would be until 01/23/2021  Condition at Discharge: good     Discharge Day Visit / Exam:     Subjective:   Patient evaluated this morning  He feels much better, denies nausea, vomiting, diarrhea constipation  His breathing room air  Electrolytes have normalized  No other events reported  Vitals: Blood Pressure: 150/84 (01/11/21 0629)  Pulse: 57 (01/11/21 0629)  Temperature: 98 1 °F (36 7 °C) (01/11/21 0629)  Temp Source: Oral (01/05/21 1500)  Respirations: 20 (01/11/21 0629)  Height: 5' 9" (175 3 cm) (01/05/21 2117)  Weight - Scale: 77 4 kg (170 lb 10 2 oz) (01/05/21 2117)  SpO2: 97 % (01/11/21 0629)  Exam:   Physical Exam  Vitals signs and nursing note reviewed     Constitutional:       General: He is not in acute distress  Appearance: Normal appearance  He is not ill-appearing, toxic-appearing or diaphoretic  Comments: Middle-age male sitting in bed, awake, in no acute distress   HENT:      Head: Normocephalic and atraumatic  Right Ear: External ear normal       Left Ear: External ear normal       Nose: Nose normal  No congestion or rhinorrhea  Mouth/Throat:      Mouth: Mucous membranes are moist       Pharynx: Oropharynx is clear  No oropharyngeal exudate or posterior oropharyngeal erythema  Eyes:      General: No scleral icterus  Right eye: No discharge  Left eye: No discharge  Pupils: Pupils are equal, round, and reactive to light  Neck:      Musculoskeletal: Normal range of motion  No neck rigidity or muscular tenderness  Vascular: No carotid bruit  Cardiovascular:      Rate and Rhythm: Normal rate and regular rhythm  Pulses: Normal pulses  Heart sounds: No murmur  No friction rub  No gallop  Pulmonary:      Effort: Pulmonary effort is normal  No respiratory distress  Breath sounds: Normal breath sounds  No stridor  No wheezing, rhonchi or rales  Abdominal:      General: Abdomen is flat  Bowel sounds are normal  There is no distension  Palpations: Abdomen is soft  There is no mass  Tenderness: There is no abdominal tenderness  There is no guarding or rebound  Hernia: No hernia is present  Musculoskeletal: Normal range of motion  General: No swelling, tenderness, deformity or signs of injury  Lymphadenopathy:      Cervical: No cervical adenopathy  Skin:     General: Skin is warm and dry  Capillary Refill: Capillary refill takes less than 2 seconds  Coloration: Skin is not jaundiced or pale  Findings: No bruising or erythema  Neurological:      General: No focal deficit present  Mental Status: He is alert and oriented to person, place, and time  Mental status is at baseline  Cranial Nerves:  No cranial nerve deficit  Sensory: No sensory deficit  Motor: No weakness  Coordination: Coordination normal       Deep Tendon Reflexes: Reflexes normal    Psychiatric:         Mood and Affect: Mood normal          Behavior: Behavior normal          Thought Content: Thought content normal          Judgment: Judgment normal          Discussion with Family:  I have discussed with patient's significant other over the phone at the bedside  All questions answered  Discharge instructions/Information to patient and family:   See after visit summary for information provided to patient and family  Provisions for Follow-Up Care:  See after visit summary for information related to follow-up care and any pertinent home health orders  Disposition:     Home    For Discharges to [facility]:   · Not Applicable to this Patient - Not Applicable to this Patient    Planned Readmission:  No     Discharge Statement:  I spent 35 minutes discharging the patient  This time was spent on the day of discharge  I had direct contact with the patient on the day of discharge  Greater than 50% of the total time was spent examining patient, answering all patient questions, arranging and discussing plan of care with patient as well as directly providing post-discharge instructions  Additional time then spent on discharge activities  Discharge Medications:  See after visit summary for reconciled discharge medications provided to patient and family        ** Please Note: This note has been constructed using a voice recognition system **

## 2021-01-11 NOTE — DISCHARGE INSTR - AVS FIRST PAGE
DISCHARGE INSTRUCTIONS:    It was our pleasure to care for you here at [facility]     Please note the following discharge instructions:    Notable Medication Adjustments -   Take vitamin-C daily  Take vitamin-D daily  Take Zinc daily  You may take Tessalon as needed for cough up to 3 times a day  Other Instructions -   Should follow-up with his primary care physician regarding your overall health  Please review this entire after visit summary as additional general instructions including medication list, appointments, activity, diet, any pertinent wound care, and other additional recommendations from your care team that may be provided for you     -----------------------------------    [address]    Your healthcare provider and/or public health staff have evaluated you and have determined that you do not need to remain in the hospital at this time  At this time you can be isolated at home where you will be monitored by staff from your local or state health department  You should carefully follow the prevention and isolation steps below until a healthcare provider or local or state health department says that you can return to your normal activities  Stay home except to get medical care    People who are mildly ill with COVID-19 are able to isolate at home during their illness  You should restrict activities outside your home, except for getting medical care  Do not go to work, school, or public areas  Avoid using public transportation, ride-sharing, or taxis  Separate yourself from other people and animals in your home    People: As much as possible, you should stay in a specific room and away from other people in your home  Also, you should use a separate bathroom, if available  Animals: You should restrict contact with pets and other animals while you are sick with COVID-19, just like you would around other people   Although there have not been reports of pets or other animals becoming sick with COVID-19, it is still recommended that people sick with COVID-19 limit contact with animals until more information is known about the virus  When possible, have another member of your household care for your animals while you are sick  If you are sick with COVID-19, avoid contact with your pet, including petting, snuggling, being kissed or licked, and sharing food  If you must care for your pet or be around animals while you are sick, wash your hands before and after you interact with pets and wear a facemask  See COVID-19 and Animals for more information  Call ahead before visiting your doctor    If you have a medical appointment, call the healthcare provider and tell them that you have or may have COVID-19  This will help the healthcare providers office take steps to keep other people from getting infected or exposed  Wear a facemask    You should wear a facemask when you are around other people (e g , sharing a room or vehicle) or pets and before you enter a healthcare providers office  If you are not able to wear a facemask (for example, because it causes trouble breathing), then people who live with you should not stay in the same room with you, or they should wear a facemask if they enter your room  Cover your coughs and sneezes    Cover your mouth and nose with a tissue when you cough or sneeze  Throw used tissues in a lined trash can  Immediately wash your hands with soap and water for at least 20 seconds or, if soap and water are not available, clean your hands with an alcohol-based hand  that contains at least 60% alcohol  Clean your hands often    Wash your hands often with soap and water for at least 20 seconds, especially after blowing your nose, coughing, or sneezing; going to the bathroom; and before eating or preparing food   If soap and water are not readily available, use an alcohol-based hand  with at least 60% alcohol, covering all surfaces of your hands and rubbing them together until they feel dry  Soap and water are the best option if hands are visibly dirty  Avoid touching your eyes, nose, and mouth with unwashed hands  Avoid sharing personal household items    You should not share dishes, drinking glasses, cups, eating utensils, towels, or bedding with other people or pets in your home  After using these items, they should be washed thoroughly with soap and water  Clean all high-touch surfaces everyday    High touch surfaces include counters, tabletops, doorknobs, bathroom fixtures, toilets, phones, keyboards, tablets, and bedside tables  Also, clean any surfaces that may have blood, stool, or body fluids on them  Use a household cleaning spray or wipe, according to the label instructions  Labels contain instructions for safe and effective use of the cleaning product including precautions you should take when applying the product, such as wearing gloves and making sure you have good ventilation during use of the product  Monitor your symptoms    Seek prompt medical attention if your illness is worsening (e g , difficulty breathing)  Before seeking care, call your healthcare provider and tell them that you have, or are being evaluated for, COVID-19  Put on a facemask before you enter the facility  These steps will help the healthcare providers office to keep other people in the office or waiting room from getting infected or exposed  Ask your healthcare provider to call the local or state health department  Persons who are placed under active monitoring or facilitated self-monitoring should follow instructions provided by their local health department or occupational health professionals, as appropriate  If you have a medical emergency and need to call 911, notify the dispatch personnel that you have, or are being evaluated for COVID-19  If possible, put on a facemask before emergency medical services arrive      Discontinuing home isolation    Patients with confirmed COVID-19 should remain under home isolation precautions until the following conditions are met:   - They have had no fever for at least 24 hours (that is one full day of no fever without the use medicine that reduces fevers)  AND  - other symptoms have improved (for example, when their cough or shortness of breath have improved)  AND  - If had mild or moderate illness, at least 10 days have passed since their symptoms first appeared or if severe illness (needed oxygen) or immunosuppressed, at least 20 days have passed since symptoms first appeared  Patients with confirmed COVID-19 should also notify close contacts (including their workplace) and ask that they self-quarantine  Currently, close contact is defined as being within 6 feet for 15 minutes or more from the period 24 hours starting 48 hours before symptom onset to the time at which the patient went into isolation  Close contacts of patients diagnosed with COVID-19 should be instructed by the patient to self-quarantine for 14 days from the last time of their last contact with the patient       Source: [employer]

## 2021-01-11 NOTE — QUICK NOTE
Patient having transportation difficulties unable to get wheelchair Irineo Duggan and patient is unable to contact family discharged discontinue for today and patient can discharge home via transport tomorrow

## 2021-01-11 NOTE — PLAN OF CARE
Problem: Nutrition/Hydration-ADULT  Goal: Nutrient/Hydration intake appropriate for improving, restoring or maintaining nutritional needs  Description: Monitor and assess patient's nutrition/hydration status for malnutrition  Collaborate with interdisciplinary team and initiate plan and interventions as ordered  Monitor patient's weight and dietary intake as ordered or per policy  Utilize nutrition screening tool and intervene as necessary  Determine patient's food preferences and provide high-protein, high-caloric foods as appropriate       INTERVENTIONS:  - Monitor oral intake, urinary output, labs, and treatment plans  - Assess nutrition and hydration status and recommend course of action  - Evaluate amount of meals eaten  - Assist patient with eating if necessary   - Allow adequate time for meals  - Recommend/ encourage appropriate diets, oral nutritional supplements, and vitamin/mineral supplements  - Order, calculate, and assess calorie counts as needed  - Assess need for intravenous fluids  - Provide nutrition/hydration education as appropriate  - Include patient/family/caregiver in decisions related to nutrition  Outcome: Progressing     Problem: PAIN - ADULT  Goal: Verbalizes/displays adequate comfort level or baseline comfort level  Description: Interventions:  - Encourage patient to monitor pain and request assistance  - Assess pain using appropriate pain scale  - Administer analgesics based on type and severity of pain and evaluate response  - Implement non-pharmacological measures as appropriate and evaluate response  - Consider cultural and social influences on pain and pain management  - Notify physician/advanced practitioner if interventions unsuccessful or patient reports new pain  Outcome: Progressing     Problem: INFECTION - ADULT  Goal: Absence or prevention of progression during hospitalization  Description: INTERVENTIONS:  - Assess and monitor for signs and symptoms of infection  - Monitor lab/diagnostic results  - Monitor all insertion sites, i e  indwelling lines, tubes, and drains  - Monitor endotracheal if appropriate and nasal secretions for changes in amount and color  - [place] appropriate cooling/warming therapies per order  - Administer medications as ordered  - Instruct and encourage patient and family to use good hand hygiene technique  - Identify and instruct in appropriate isolation precautions for identified infection/condition  Outcome: Progressing     Problem: DISCHARGE PLANNING  Goal: Discharge to home or other facility with appropriate resources  Description: INTERVENTIONS:  - Identify barriers to discharge w/patient and caregiver  - Arrange for needed discharge resources and transportation as appropriate  - Identify discharge learning needs (meds, wound care, etc )  - Arrange for interpretive services to assist at discharge as needed  - Refer to Case Management Department for coordinating discharge planning if the patient needs post-hospital services based on physician/advanced practitioner order or complex needs related to functional status, cognitive ability, or social support system  Outcome: Progressing     Problem: Knowledge Deficit  Goal: Patient/family/caregiver demonstrates understanding of disease process, treatment plan, medications, and discharge instructions  Description: Complete learning assessment and assess knowledge base    Interventions:  - Provide teaching at level of understanding  - Provide teaching via preferred learning methods  Outcome: Progressing     Problem: RESPIRATORY - ADULT  Goal: Achieves optimal ventilation and oxygenation  Description: INTERVENTIONS:  - Assess for changes in respiratory status  - Assess for changes in mentation and behavior  - Position to facilitate oxygenation and minimize respiratory effort  - Oxygen administered by appropriate delivery if ordered  - Initiate smoking cessation education as indicated  - Encourage broncho-pulmonary hygiene including cough, deep breathe, Incentive Spirometry  - Assess the need for suctioning and aspirate as needed  - Assess and instruct to report SOB or any respiratory difficulty  - Respiratory Therapy support as indicated  Outcome: Progressing     Problem: METABOLIC, FLUID AND ELECTROLYTES - ADULT  Goal: Glucose maintained within target range  Description: INTERVENTIONS:  - Monitor Blood Glucose as ordered  - Assess for signs and symptoms of hyperglycemia and hypoglycemia  - Administer ordered medications to maintain glucose within target range  - Assess nutritional intake and initiate nutrition service referral as needed  Outcome: Progressing     Problem: Potential for Falls  Goal: Patient will remain free of falls  Description: INTERVENTIONS:  - Assess patient frequently for physical needs  -  Identify cognitive and physical deficits and behaviors that affect risk of falls    -  [place] fall precautions as indicated by assessment   - Educate patient/family on patient safety including physical limitations  - Instruct patient to call for assistance with activity based on assessment  - Modify environment to reduce risk of injury  - Consider OT/PT consult to assist with strengthening/mobility  Outcome: Progressing

## 2021-01-11 NOTE — ASSESSMENT & PLAN NOTE
· Reports being diagnosed on 12/23 with COVID-19  · Since this past Sunday has reported feeling increased generalized weakness, fatigue, shortness of breath with productive cough    Was hospitalized and treated vitamin-C, vitamin-D, zinc, IV dexamethasone, ceftriaxone, doxycycline  He responded very well and is currently off oxygen  He has been discharged in stable condition, was oriented to self quarentine, he should follow-up as an outpatient

## 2021-01-11 NOTE — ASSESSMENT & PLAN NOTE
Lab Results   Component Value Date    HGBA1C 10 5 (H) 01/05/2021       Recent Labs     01/10/21  1540 01/10/21  2026 01/11/21  0638 01/11/21  1059   POCGLU 399* 345* 274* 229*       Blood Sugar Average: Last 72 hrs:  · (P) 277 [id number] Check A1c  Blood sugars were uncontrolled during the hospitalization due to steroids but now that will stop steroids they will likely be improved  Continue metformin

## 2021-01-11 NOTE — PROGRESS NOTES
NEPHROLOGY PROGRESS NOTE    Patient: Man Burkett               Sex: male          DOA: 1/4/2021  8:02 PM   YOB: 1975        Age:  48 y o         LOS:  LOS: 7 days   1/11/2021    REASON FOR THE CONSULTATION:      Hyperkalemia    SUBJECTIVE     Patient is seen and examined next to the bedside  States that breathing and cough is better      CURRENT MEDICATIONS       Current Facility-Administered Medications:     ascorbic acid (VITAMIN C) tablet 1,000 mg, 1,000 mg, Oral, Q12H [address], Simran Nicholas PA-C, 1,000 mg at 01/11/21 6575    benzonatate (TESSALON PERLES) capsule 100 mg, 100 mg, Oral, TID, Man Aguilar MD, 100 mg at 01/11/21 0834    ceftriaxone (ROCEPHIN) 1 g/50 mL in dextrose IVPB, 1,000 mg, Intravenous, Q24H, Colby Mckeon PA-C, Last Rate: 100 mL/hr at 01/10/21 2144, 1,000 mg at 01/10/21 2144    cholecalciferol (VITAMIN D3) tablet 2,000 Units, 2,000 Units, Oral, Daily, Colby Mckeon PA-C, 2,000 Units at 01/11/21 3799    dextromethorphan-guaiFENesin (ROBITUSSIN DM) oral syrup 10 mL, 10 mL, Oral, Q4H PRN, Man Aguilar MD    doxycycline hyclate (VIBRAMYCIN) capsule 100 mg, 100 mg, Oral, Q12H, Simran Nicholas PA-C, 100 mg at 01/11/21 0834    enoxaparin (LOVENOX) subcutaneous injection 80 mg, 1 mg/kg, Subcutaneous, Q12H [address], Man Aguilar MD, 80 mg at 01/11/21 0834    famotidine (PEPCID) tablet 20 mg, 20 mg, Oral, BID, Simran Nicholas PA-C, 20 mg at 01/11/21 7753    fludrocortisone (FLORINEF) tablet 0 05 mg, 0 05 mg, Oral, Daily, Man Aguilar MD, 0 05 mg at 01/11/21 0834    hydrocortisone sodium succinate (PF) (Solu-CORTEF) injection 25 mg, 25 mg, Intravenous, Q8H [address], Man Aguilar MD, 25 mg at 01/11/21 0629    insulin detemir (LEVEMIR) subcutaneous injection 15 Units, 15 Units, Subcutaneous, HS, Man Aguilar MD, 15 Units at 01/10/21 2144    insulin lispro (HumaLOG) 100 units/mL subcutaneous injection 1-5 Units, 1-5 Units, Subcutaneous, TID AC, 4 Units at 01/11/21 0836 **AND** Fingerstick Glucose (POCT), , , TID AC, Simran Nicholas PA-C    insulin lispro (HumaLOG) 100 units/mL subcutaneous injection 1-5 Units, 1-5 Units, Subcutaneous, HS, Ever ScalesRUSTY, 4 Units at 01/10/21 2147    Lidocaine Viscous HCl (XYLOCAINE) 2 % mucosal solution 15 mL, 15 mL, Swish & Spit, 4x Daily PRN, Terence Bertrand MD    melatonin tablet 3 mg, 3 mg, Oral, HS, Andre Kimble PA-C, 3 mg at 01/10/21 2144    [COMPLETED] zinc sulfate (ZINCATE) capsule 220 mg, 220 mg, Oral, Daily, 220 mg at 01/11/21 0834 **FOLLOWED BY** [START ON 1/12/2021] multivitamin-minerals (CENTRUM ADULTS) tablet 1 tablet, 1 tablet, Oral, Daily, Ever RUSTY Lopez    sodium chloride 0 9 % infusion, 100 mL/hr, Intravenous, Continuous, Elle Daly MD, Last Rate: 100 mL/hr at 01/11/21 0917, 100 mL/hr at 01/11/21 0917    REVIEW OF SYSTEMS     Review of Systems   Constitutional: Negative  HENT: Negative  Eyes: Negative  Respiratory: Negative  Cardiovascular: Negative  Gastrointestinal: Negative  Endocrine: Negative  Genitourinary: Negative  Musculoskeletal: Negative  Skin: Negative  Allergic/Immunologic: Negative  Neurological: Negative  Hematological: Negative  All other systems reviewed and are negative  OBJECTIVE     Current Weight: Weight - Scale: 77 4 kg (170 lb 10 2 oz)  Vitals:    01/11/21 0629   BP: 150/84   Pulse: 57   Resp: 20   Temp: 98 1 °F (36 7 °C)   SpO2: 97%     Body mass index is 25 2 kg/m²  Intake/Output Summary (Last 24 hours) at 1/11/2021 1032  Last data filed at 1/11/2021 0300  Gross per 24 hour   Intake 1020 ml   Output    Net 1020 ml       PHYSICAL EXAMINATION     Physical Exam  HENT:      Head: Normocephalic and atraumatic  Eyes:      Pupils: Pupils are equal, round, and reactive to light  Neck:      Musculoskeletal: Neck supple  Vascular: No JVD  Cardiovascular:      Rate and Rhythm: Normal rate and regular rhythm  Heart sounds: Normal heart sounds  No murmur  No friction rub  Pulmonary:      Effort: Pulmonary effort is normal       Breath sounds: Rhonchi present  Abdominal:      General: Bowel sounds are normal  There is no distension  Palpations: Abdomen is soft  Tenderness: There is no abdominal tenderness  There is no rebound  Musculoskeletal:         General: No tenderness  Skin:     General: Skin is dry  Findings: No rash  Neurological:      Mental Status: He is alert and oriented to person, place, and time  LAB RESULTS     Results from last 7 days   Lab Units 01/11/21  0630 01/10/21  1446 01/10/21  0528 01/09/21  0526 01/08/21  0925 01/07/21  1404 01/07/21  0539 01/06/21  1000 01/05/21  0617 01/04/21  2052   WBC Thousand/uL 7 88  --  6 48 6 72 6 90  --  7 00 8 43 6 80 7 38   HEMOGLOBIN g/dL 12 5  --  13 4 13 5 13 8  --  13 6 12 1 12 3 13 5   HEMATOCRIT % 37 8  --  40 9 40 7 41 6  --  42 0 36 9 37 7 40 9   PLATELETS Thousands/uL 526*  --  582* 590* 568*  --  527* 456* 405* 425*   POTASSIUM mmol/L 4 4  4 5 4 1 6 5* 5 2 4 6 5 1 6 0* 4 9 4 8 4 2   CHLORIDE mmol/L 101  101  --  101 99* 99* 100 101 102 103 99*   CO2 mmol/L 27  26  --  28 24 26 24 23 22 25 27   BUN mg/dL 22  21  --  29* 26* 22 27* 25 27* 20 18   CREATININE mg/dL 0 98  0 99  --  1 14 1 04 1 17 1 04 1 04 1 03 1 19 1 41*   EGFR ml/min/1 73sq m 105  104  --  87 98 85 98 98 100 84 68   CALCIUM mg/dL 8 9  9 0  --  9 5 9 1 8 9 8 7 9 1 8 3 8 6 8 7   MAGNESIUM mg/dL  --   --   --   --   --   --   --   --  3 0* 2 9*           RADIOLOGY RESULTS      Reviewed      ASSESSMENT/PLAN     48years old male with past medical history of diabetes mellitus type 2 who presented to our facility with worsening cough with shortness breath and was diagnosed with COVID-19 pneumonia      1  Hyperkalemia:  Etiology of hyperkalemia was thought to be secondary to efflux of potassium secondary to osmotic pull of blood sugar plus probable low cortisol level    Noted improvement serum potassium down to 4 4 mEq per L this morning   -will DC bicarbonate based fluids  -as long as hyperglycemia persist, will keep patient on normal saline at 100 cc/hour  2  COVID-19 pneumonia:  Remains on steroids and IV antibiotics  3  Hyponatremia:  Serum sodium 134 uncorrected with correction for hyperglycemia noted to be close to 137 meq/L and improved  Will sign off at this time    Call as needed    Aleyda Li MD  Nephrology  1/11/2021

## 2021-01-11 NOTE — ASSESSMENT & PLAN NOTE
· Creatinine peaked 1 41  · Denies history of CKD, no baseline per review of chart  · Now 1 0 which is adequate for him    · Continue to monitor as outpatient

## 2021-01-11 NOTE — ASSESSMENT & PLAN NOTE
He did have hyperkalemia during hospitalization of potassium as high as 6 5  No obvious etiology for this  Also had mild hyponatremia  Suspicion for adrenal insufficiency due to dexamethasone used for COVID  Unlikely primary due to normal BP and bicarbonate  Hyperkalemia was treated and currently potassium is better  Since patient will be off steroids suspect that electrolyte abnormalities will resolve now  Outpatient follow-up

## 2021-01-12 LAB
ATRIAL RATE: 55 BPM
P AXIS: 54 DEGREES
PR INTERVAL: 158 MS
QRS AXIS: 27 DEGREES
QRSD INTERVAL: 78 MS
QT INTERVAL: 412 MS
QTC INTERVAL: 394 MS
T WAVE AXIS: -30 DEGREES
VENTRICULAR RATE: 55 BPM

## 2021-01-12 PROCEDURE — 93010 ELECTROCARDIOGRAM REPORT: CPT | Performed by: INTERNAL MEDICINE

## 2021-01-12 NOTE — QUICK NOTE
Notified by nursing that patient's transport has arrived and patient requiring discharge order placed  Discharge orders were put in, patient has already been prescribed proper medications by his physician and has been given instructions to self quarantine    Discharge summary already completed by Dr Tiara Resendiz

## 2021-05-22 ENCOUNTER — IMMUNIZATIONS (OUTPATIENT)
Dept: FAMILY MEDICINE CLINIC | Facility: HOSPITAL | Age: 46
End: 2021-05-22

## 2021-05-22 DIAGNOSIS — Z23 ENCOUNTER FOR IMMUNIZATION: Primary | ICD-10-CM

## 2021-05-22 PROCEDURE — 91300 SARS-COV-2 / COVID-19 MRNA VACCINE (PFIZER-BIONTECH) 30 MCG: CPT

## 2021-05-22 PROCEDURE — 0001A SARS-COV-2 / COVID-19 MRNA VACCINE (PFIZER-BIONTECH) 30 MCG: CPT

## 2021-06-12 ENCOUNTER — IMMUNIZATIONS (OUTPATIENT)
Dept: FAMILY MEDICINE CLINIC | Facility: HOSPITAL | Age: 46
End: 2021-06-12

## 2021-06-12 DIAGNOSIS — Z23 ENCOUNTER FOR IMMUNIZATION: Primary | ICD-10-CM

## 2021-06-12 PROCEDURE — 0002A SARS-COV-2 / COVID-19 MRNA VACCINE (PFIZER-BIONTECH) 30 MCG: CPT

## 2021-06-12 PROCEDURE — 91300 SARS-COV-2 / COVID-19 MRNA VACCINE (PFIZER-BIONTECH) 30 MCG: CPT

## 2022-10-12 PROBLEM — A41.9 SEPSIS (HCC): Status: RESOLVED | Noted: 2021-01-05 | Resolved: 2022-10-12

## 2022-10-12 PROBLEM — U07.1 PNEUMONIA DUE TO COVID-19 VIRUS: Status: RESOLVED | Noted: 2021-01-05 | Resolved: 2022-10-12

## 2022-10-12 PROBLEM — J12.82 PNEUMONIA DUE TO COVID-19 VIRUS: Status: RESOLVED | Noted: 2021-01-05 | Resolved: 2022-10-12

## 2023-02-11 NOTE — CASE MANAGEMENT
Pt needs transportation to get home  CM contacted [facility] and he cannot go by [employer] due to being Covid +  Pete Angelo will call back with a time for a w/c Scott Krishna and will inform [phone] [phone]  Pt informed of OOP cost and is agreeable 
Orthopedic

## 2025-01-19 ENCOUNTER — HOSPITAL ENCOUNTER (EMERGENCY)
Facility: HOSPITAL | Age: 50
Discharge: HOME | End: 2025-01-19
Payer: MEDICAID

## 2025-01-19 VITALS
HEART RATE: 89 BPM | WEIGHT: 173.28 LBS | RESPIRATION RATE: 18 BRPM | DIASTOLIC BLOOD PRESSURE: 60 MMHG | TEMPERATURE: 97.8 F | OXYGEN SATURATION: 97 % | BODY MASS INDEX: 25.59 KG/M2 | SYSTOLIC BLOOD PRESSURE: 119 MMHG

## 2025-01-19 DIAGNOSIS — J40 BRONCHITIS: Primary | ICD-10-CM

## 2025-01-19 LAB
FLUAV AG UPPER RESP QL IA.RAPID: NEGATIVE
FLUBV AG UPPER RESP QL IA.RAPID: NEGATIVE
SARS-COV+SARS-COV-2 AG RESP QL IA.RAPID: NEGATIVE

## 2025-01-19 PROCEDURE — 99284 EMERGENCY DEPT VISIT MOD MDM: CPT | Performed by: NURSE PRACTITIONER

## 2025-01-19 PROCEDURE — 87804 INFLUENZA ASSAY W/OPTIC: CPT

## 2025-01-19 PROCEDURE — 99283 EMERGENCY DEPT VISIT LOW MDM: CPT

## 2025-01-19 PROCEDURE — 87811 SARS-COV-2 COVID19 W/OPTIC: CPT

## 2025-01-19 RX ORDER — DOXYCYCLINE 100 MG/1
100 CAPSULE ORAL 2 TIMES DAILY
Qty: 14 CAPSULE | Refills: 0 | Status: SHIPPED | OUTPATIENT
Start: 2025-01-19 | End: 2025-01-26

## 2025-01-19 RX ORDER — PREDNISONE 20 MG/1
60 TABLET ORAL DAILY
Qty: 15 TABLET | Refills: 0 | Status: SHIPPED | OUTPATIENT
Start: 2025-01-19 | End: 2025-01-24

## 2025-01-19 RX ORDER — ALBUTEROL SULFATE 90 UG/1
2 INHALANT RESPIRATORY (INHALATION) EVERY 6 HOURS PRN
Qty: 6.7 G | Refills: 0 | Status: SHIPPED | OUTPATIENT
Start: 2025-01-19

## 2025-01-19 NOTE — ED PROVIDER NOTES
Time reflects when diagnosis was documented in both MDM as applicable and the Disposition within this note       Time User Action Codes Description Comment    1/19/2025  2:40 PM Kodak Chinchilla Add [J40] Bronchitis           ED Disposition       ED Disposition   Discharge    Condition   Stable    Date/Time   Sun Jan 19, 2025  2:40 PM    Comment   Jed Hand discharge to home/self care.                   Assessment & Plan       Medical Decision Making  Negative COVID and flu.  Symptoms are consistent with acute bronchitis.  Will cover empirically with antibiotics and treat with bronchodilators and steroids.    Risk  Prescription drug management.             Medications - No data to display    ED Risk Strat Scores                          SBIRT 22yo+      Flowsheet Row Most Recent Value   Initial Alcohol Screen: US AUDIT-C     1. How often do you have a drink containing alcohol? 0 Filed at: 01/19/2025 1426   2. How many drinks containing alcohol do you have on a typical day you are drinking?  0 Filed at: 01/19/2025 1426   3a. Male UNDER 65: How often do you have five or more drinks on one occasion? 0 Filed at: 01/19/2025 1426   Audit-C Score 0 Filed at: 01/19/2025 1426   BERTA: How many times in the past year have you...    Used an illegal drug or used a prescription medication for non-medical reasons? Never Filed at: 01/19/2025 1426                            History of Present Illness       Chief Complaint   Patient presents with    Flu Symptoms     Pt c/o cough, sweating, fever and chills since Thursday       History reviewed. No pertinent past medical history.   History reviewed. No pertinent surgical history.   History reviewed. No pertinent family history.   Social History     Tobacco Use    Smoking status: Never    Smokeless tobacco: Never   Substance Use Topics    Alcohol use: Not Currently    Drug use: Not Currently      E-Cigarette/Vaping      E-Cigarette/Vaping Substances      I have reviewed and agree with  the history as documented.     50-year-old male patient presenting here with a chief complaint of cough and a feeling of chest tightness.  Feeling subjective fever and chills.  Symptoms present for the last day or 2.      Flu Symptoms  Presenting symptoms: cough    Presenting symptoms: no fever, no shortness of breath, no sore throat and no vomiting    Associated symptoms: no chills and no ear pain        Review of Systems   Constitutional:  Negative for chills and fever.   HENT:  Negative for ear pain and sore throat.    Eyes:  Negative for pain and visual disturbance.   Respiratory:  Positive for cough and wheezing. Negative for shortness of breath.    Cardiovascular:  Negative for chest pain and palpitations.   Gastrointestinal:  Negative for abdominal pain and vomiting.   Genitourinary:  Negative for dysuria and hematuria.   Musculoskeletal:  Negative for arthralgias and back pain.   Skin:  Negative for color change and rash.   Neurological:  Negative for seizures and syncope.   All other systems reviewed and are negative.          Objective       ED Triage Vitals [01/19/25 1406]   Temperature Pulse Blood Pressure Respirations SpO2 Patient Position - Orthostatic VS   97.8 °F (36.6 °C) 89 119/60 18 97 % Sitting      Temp Source Heart Rate Source BP Location FiO2 (%) Pain Score    Oral Monitor Left arm -- --      Vitals      Date and Time Temp Pulse SpO2 Resp BP Pain Score FACES Pain Rating User   01/19/25 1406 97.8 °F (36.6 °C) 89 97 % 18 119/60 -- -- JK            Physical Exam  Vitals and nursing note reviewed.   Constitutional:       General: He is not in acute distress.     Appearance: He is well-developed.   HENT:      Head: Normocephalic and atraumatic.      Nose: No rhinorrhea.   Eyes:      General:         Right eye: No discharge.         Left eye: No discharge.      Conjunctiva/sclera: Conjunctivae normal.   Cardiovascular:      Rate and Rhythm: Normal rate.   Pulmonary:      Effort: Pulmonary effort is  normal. No respiratory distress.      Breath sounds: Wheezing present.   Abdominal:      General: There is no distension.      Tenderness: There is no guarding.   Musculoskeletal:         General: No deformity.      Cervical back: Normal range of motion and neck supple.   Skin:     General: Skin is warm and dry.      Coloration: Skin is not pale.   Neurological:      Mental Status: He is alert and oriented to person, place, and time.      Coordination: Coordination normal.   Psychiatric:         Mood and Affect: Mood normal.         Results Reviewed       Procedure Component Value Units Date/Time    FLU/COVID Rapid Antigen (30 min. TAT) - Preferred screening test in ED [[id number]]  (Normal) Collected: 01/19/25 1412    Lab Status: Final result Specimen: Nares from Nose Updated: 01/19/25 1432     SARS COV Rapid Antigen Negative     Influenza A Rapid Antigen Negative     Influenza B Rapid Antigen Negative    Narrative:      This test has been performed using the Visysidel Josefina 2 FLU+SARS Antigen test under the Emergency Use Authorization (EUA). This test has been validated by the  and verified by the performing laboratory. The Josefina uses lateral flow immunofluorescent sandwich assay to detect SARS-COV, Influenza A and Influenza B Antigen.     The Quidel Josefina 2 SARS Antigen test does not differentiate between SARS-CoV and SARS-CoV-2.     Negative results are presumptive and may be confirmed with a molecular assay, if necessary, for patient management. Negative results do not rule out SARS-CoV-2 or influenza infection and should not be used as the sole basis for treatment or patient management decisions. A negative test result may occur if the level of antigen in a sample is below the limit of detection of this test.     Positive results are indicative of the presence of viral antigens, but do not rule out bacterial infection or co-infection with other viruses.     All test results should be used as an adjunct  to clinical observations and other information available to the provider.    FOR PEDIATRIC PATIENTS - copy/paste COVID Guidelines URL to browser: [email or web address]            No orders to display       Procedures    ED Medication and Procedure Management   Prior to Admission Medications   Prescriptions Last Dose Informant Patient Reported? Taking?   ascorbic acid (VITAMIN C) 1000 MG tablet   No No   Sig: Take 1 tablet (1,000 mg total) by mouth every 12 (twelve) hours for 1 dose   benzonatate (TESSALON PERLES) 100 mg capsule   No No   Sig: Take 1 capsule (100 mg total) by mouth 3 (three) times a day as needed for cough   cholecalciferol (VITAMIN D3) 1,000 units tablet   No No   Sig: Take 2 tablets (2,000 Units total) by mouth daily   metFORMIN (GLUCOPHAGE) 500 mg tablet  Self Yes No   Sig: Take 500 mg by mouth 2 (two) times a day with meals   zinc sulfate (ZINCATE) 220 mg capsule   No No   Sig: Take 1 capsule (220 mg total) by mouth daily      Facility-Administered Medications: None     Patient's Medications   Discharge Prescriptions    ALBUTEROL (PROVENTIL HFA) 90 MCG/ACT INHALER    Inhale 2 puffs every 6 (six) hours as needed for wheezing       Start Date: 1/19/2025 End Date: --       Order Dose: 2 puffs       Quantity: 6.7 g    Refills: 0    DOXYCYCLINE HYCLATE (VIBRAMYCIN) 100 MG CAPSULE    Take 1 capsule (100 mg total) by mouth 2 (two) times a day for 7 days       Start Date: 1/19/2025 End Date: 1/26/2025       Order Dose: 100 mg       Quantity: 14 capsule    Refills: 0    PREDNISONE 20 MG TABLET    Take 3 tablets (60 mg total) by mouth daily for 5 days       Start Date: 1/19/2025 End Date: 1/24/2025       Order Dose: 60 mg       Quantity: 15 tablet    Refills: 0     No discharge procedures on file.  ED SEPSIS DOCUMENTATION   Time reflects when diagnosis was documented in both MDM as applicable and the Disposition within this note       Time User Action  Codes Description Comment    1/19/2025  2:40 PM Kodak Chinchilla Add [J40] Bronchitis                  JESUS ALBERTO Mejia  01/19/25 1444

## 2025-01-19 NOTE — Clinical Note
Jed Hand was seen and treated in our emergency department on 1/19/2025.                Diagnosis:     Jed  may return to work on return date.    He may return on this date: 01/22/2025         If you have any questions or concerns, please don't hesitate to call.      JESUS ALBERTO Mejia    ______________________________           _______________          _______________  Hospital Representative                              Date                                Time

## 2025-02-07 NOTE — ASSESSMENT & PLAN NOTE
Lab Results   Component Value Date    HGBA1C 10 5 (H) 01/05/2021       Recent Labs     01/05/21  1626 01/05/21  2138 01/06/21  0830 01/06/21  1111   POCGLU 164* 180* 129 232*       Blood Sugar Average: Last 72 hrs:  · (P) 661 [id number] Check A1c  · Blood glucose checks q i d , hypoglycemia protocol  · Will hold home metformin  · Sliding scale insulin Name band;